# Patient Record
Sex: FEMALE | Race: ASIAN | NOT HISPANIC OR LATINO | Employment: FULL TIME | URBAN - METROPOLITAN AREA
[De-identification: names, ages, dates, MRNs, and addresses within clinical notes are randomized per-mention and may not be internally consistent; named-entity substitution may affect disease eponyms.]

---

## 2018-12-12 ENCOUNTER — OFFICE VISIT (OUTPATIENT)
Dept: OBSTETRICS AND GYNECOLOGY | Facility: CLINIC | Age: 43
End: 2018-12-12
Payer: COMMERCIAL

## 2018-12-12 VITALS
HEIGHT: 65 IN | HEART RATE: 96 BPM | BODY MASS INDEX: 20.42 KG/M2 | DIASTOLIC BLOOD PRESSURE: 72 MMHG | SYSTOLIC BLOOD PRESSURE: 135 MMHG | WEIGHT: 122.56 LBS | RESPIRATION RATE: 15 BRPM

## 2018-12-12 DIAGNOSIS — Z12.31 BREAST CANCER SCREENING BY MAMMOGRAM: ICD-10-CM

## 2018-12-12 DIAGNOSIS — Z01.419 ENCOUNTER FOR WELL WOMAN EXAM WITH ROUTINE GYNECOLOGICAL EXAM: Primary | ICD-10-CM

## 2018-12-12 DIAGNOSIS — Z12.4 ENCOUNTER FOR PAPANICOLAOU SMEAR FOR CERVICAL CANCER SCREENING: ICD-10-CM

## 2018-12-12 PROCEDURE — 87624 HPV HI-RISK TYP POOLED RSLT: CPT

## 2018-12-12 PROCEDURE — 99386 PREV VISIT NEW AGE 40-64: CPT | Mod: S$GLB,,, | Performed by: OBSTETRICS & GYNECOLOGY

## 2018-12-12 PROCEDURE — 99999 PR PBB SHADOW E&M-NEW PATIENT-LVL III: CPT | Mod: PBBFAC,,, | Performed by: OBSTETRICS & GYNECOLOGY

## 2018-12-12 PROCEDURE — 88175 CYTOPATH C/V AUTO FLUID REDO: CPT

## 2018-12-12 NOTE — PROGRESS NOTES
SUBJECTIVE:   Alejandra Ellis is a 43 y.o. female   for annual well woman exam. No LMP recorded..      Contraception: none, h/o infertility, ovarian factor, tested at Dr. Tess Ellis's office  Cycles were monthly before, but last cycle skipped 2 months. Also has intermittent vasomotor symptoms - rare    No past medical history on file.  No past surgical history on file.  Social History     Socioeconomic History    Marital status:      Spouse name: Not on file    Number of children: Not on file    Years of education: Not on file    Highest education level: Not on file   Social Needs    Financial resource strain: Not on file    Food insecurity - worry: Not on file    Food insecurity - inability: Not on file    Transportation needs - medical: Not on file    Transportation needs - non-medical: Not on file   Occupational History    Not on file   Tobacco Use    Smoking status: Never Smoker    Smokeless tobacco: Never Used   Substance and Sexual Activity    Alcohol use: No     Frequency: Never    Drug use: No    Sexual activity: No   Other Topics Concern    Not on file   Social History Narrative    Not on file     No family history on file.  OB History    Para Term  AB Living   0 0 0 0 0 0   SAB TAB Ectopic Multiple Live Births   0 0 0 0 0         Obstetric Comments   Gynhx: regular every 2 months   Denies abnl pap   Denies abnl MMG,          No current outpatient medications on file.     No current facility-administered medications for this visit.      Allergies: Patient has no known allergies.       ROS:  GENERAL: Denies weight gain or weight loss. Feeling well overall.   SKIN: Denies rash or lesions.   HEAD: Denies head injury or headache.   NODES: Denies enlarged lymph nodes.   CHEST: Denies chest pain or shortness of breath.   CARDIOVASCULAR: Denies palpitations or left sided chest pain.   ABDOMEN: No abdominal pain, constipation, diarrhea, nausea, vomiting or rectal bleeding.  "  URINARY: No frequency, dysuria, hematuria, or burning on urination.  REPRODUCTIVE: Denies vaginal discharge, abnormal vaginal bleeding, lesions, pelvic pain  BREASTS: The patient performs breast self-examination and denies pain, lumps, or nipple discharge.   HEMATOLOGIC: No easy bruisability or excessive bleeding.  MUSCULOSKELETAL: Denies joint pain or swelling.   NEUROLOGIC: Denies syncope or weakness.   PSYCHIATRIC: Denies depression, anxiety or mood swings.      OBJECTIVE:   /72   Pulse 96   Resp 15   Ht 5' 5" (1.651 m)   Wt 55.6 kg (122 lb 9.2 oz)   BMI 20.40 kg/m²   The patient appears well, alert, oriented x 3, in no distress.  PSYCH:  Normal, full range of affect  NECK: negative, no thyromegaly, trachea midline  SKIN: normal, good color, good turgor and no acne, striae, hirsutism  BREAST EXAM: breasts appear normal, no suspicious masses, no skin or nipple changes or axillary nodes  ABDOMEN: soft, non-tender; bowel sounds normal; no masses,  no organomegaly and no hernias, masses, or hepatosplenomegaly  GENITALIA: normal external genitalia, no erythema, no discharge  BLADDER: soft  URETHRA: normal appearing urethra with no masses, tenderness or lesions and normal urethra, normal urethral meatus  VAGINA: Normal  CERVIX: no lesions or cervical motion tenderness  UTERUS: normal size, contour, position, consistency, mobility, non-tender  ADNEXA: no mass, fullness, tenderness      ASSESSMENT:   1. Health maintenance  -pap done. Discussed ASCCP guideline screening every 3 - 5 years.   -screening MMG ordered  -counseled on exercise and healthy diet  -bone health:  Discussed Vitamin D and Calcium supplementation, weight bearing exercises  2.  Discussed safety at home/school/work, healthy and balanced diet, sleep hygiene, as well as violence/weapons exposure.   3.  Perimenopausal transition: discussed supportive care for vasomotor symptoms.    "

## 2018-12-17 LAB
HPV HR 12 DNA CVX QL NAA+PROBE: NEGATIVE
HPV16 AG SPEC QL: NEGATIVE
HPV18 DNA SPEC QL NAA+PROBE: NEGATIVE

## 2018-12-20 ENCOUNTER — TELEPHONE (OUTPATIENT)
Dept: OBSTETRICS AND GYNECOLOGY | Facility: CLINIC | Age: 43
End: 2018-12-20

## 2018-12-20 NOTE — TELEPHONE ENCOUNTER
Notes recorded by Judson Bellamy LPN on 12/20/2018 at 4:19 PM CST  SPOKE WITH PT INFORMED HER THAT HER PAP SMEAR IS NORMAL, RELATIVE TRANSLATED FOR HER  ------

## 2019-01-02 ENCOUNTER — HOSPITAL ENCOUNTER (OUTPATIENT)
Dept: RADIOLOGY | Facility: HOSPITAL | Age: 44
Discharge: HOME | End: 2019-01-02
Attending: OBSTETRICS & GYNECOLOGY
Payer: COMMERCIAL

## 2019-01-02 VITALS — WEIGHT: 122 LBS | HEIGHT: 65 IN | BODY MASS INDEX: 20.33 KG/M2

## 2019-01-02 DIAGNOSIS — Z12.31 BREAST CANCER SCREENING BY MAMMOGRAM: ICD-10-CM

## 2019-01-02 PROCEDURE — 77067 MAMMO DIGITAL SCREENING BILAT WITH TOMOSYNTHESIS_CAD: ICD-10-PCS | Mod: 26,,, | Performed by: RADIOLOGY

## 2019-01-02 PROCEDURE — 77067 SCR MAMMO BI INCL CAD: CPT | Mod: 26,,, | Performed by: RADIOLOGY

## 2019-01-02 PROCEDURE — 77063 BREAST TOMOSYNTHESIS BI: CPT | Mod: 26,,, | Performed by: RADIOLOGY

## 2019-01-02 PROCEDURE — 77067 SCR MAMMO BI INCL CAD: CPT | Mod: TC

## 2019-01-02 PROCEDURE — 77063 MAMMO DIGITAL SCREENING BILAT WITH TOMOSYNTHESIS_CAD: ICD-10-PCS | Mod: 26,,, | Performed by: RADIOLOGY

## 2019-09-26 ENCOUNTER — HOSPITAL ENCOUNTER (OUTPATIENT)
Dept: RADIOLOGY | Facility: HOSPITAL | Age: 44
Discharge: HOME | End: 2019-09-26
Attending: PODIATRIST
Payer: COMMERCIAL

## 2019-09-26 DIAGNOSIS — M72.2 PLANTAR FASCIAL FIBROMATOSIS: Primary | ICD-10-CM

## 2019-09-26 DIAGNOSIS — S93.612A SPRAIN OF TARSAL LIGAMENT OF LEFT FOOT: ICD-10-CM

## 2019-09-26 DIAGNOSIS — S93.611A SPRAIN OF TARSAL LIGAMENT OF RIGHT FOOT: ICD-10-CM

## 2019-09-26 DIAGNOSIS — M72.2 PLANTAR FASCIAL FIBROMATOSIS: ICD-10-CM

## 2019-09-26 PROCEDURE — 73630 XR FOOT COMPLETE 3 VIEW RIGHT: ICD-10-PCS | Mod: 26,RT,, | Performed by: RADIOLOGY

## 2019-09-26 PROCEDURE — 73630 X-RAY EXAM OF FOOT: CPT | Mod: TC,FY,LT

## 2019-09-26 PROCEDURE — 73630 X-RAY EXAM OF FOOT: CPT | Mod: TC,FY,RT

## 2019-09-26 PROCEDURE — 73630 X-RAY EXAM OF FOOT: CPT | Mod: 26,LT,, | Performed by: RADIOLOGY

## 2019-09-26 PROCEDURE — 73630 XR FOOT COMPLETE 3 VIEW LEFT: ICD-10-PCS | Mod: 26,LT,, | Performed by: RADIOLOGY

## 2019-09-26 PROCEDURE — 73630 X-RAY EXAM OF FOOT: CPT | Mod: 26,RT,, | Performed by: RADIOLOGY

## 2019-10-15 ENCOUNTER — OFFICE VISIT (OUTPATIENT)
Dept: OBSTETRICS AND GYNECOLOGY | Facility: CLINIC | Age: 44
End: 2019-10-15
Payer: COMMERCIAL

## 2019-10-15 VITALS
SYSTOLIC BLOOD PRESSURE: 112 MMHG | DIASTOLIC BLOOD PRESSURE: 80 MMHG | BODY MASS INDEX: 20.06 KG/M2 | HEIGHT: 65 IN | WEIGHT: 120.38 LBS

## 2019-10-15 DIAGNOSIS — Z12.31 BREAST CANCER SCREENING BY MAMMOGRAM: Primary | ICD-10-CM

## 2019-10-15 PROCEDURE — 99999 PR PBB SHADOW E&M-EST. PATIENT-LVL III: CPT | Mod: PBBFAC,,, | Performed by: OBSTETRICS & GYNECOLOGY

## 2019-10-15 PROCEDURE — 99999 PR PBB SHADOW E&M-EST. PATIENT-LVL III: ICD-10-PCS | Mod: PBBFAC,,, | Performed by: OBSTETRICS & GYNECOLOGY

## 2019-10-15 PROCEDURE — 99499 NO LOS: ICD-10-PCS | Mod: S$GLB,,, | Performed by: OBSTETRICS & GYNECOLOGY

## 2019-10-15 PROCEDURE — 99499 UNLISTED E&M SERVICE: CPT | Mod: S$GLB,,, | Performed by: OBSTETRICS & GYNECOLOGY

## 2019-10-15 RX ORDER — ERGOCALCIFEROL 1.25 MG/1
CAPSULE ORAL
Refills: 3 | COMMUNITY
Start: 2019-09-25

## 2019-10-15 NOTE — PROGRESS NOTES
Pt here for WWE  No complaints  She is not due for WWE until 12/2019  Will rescheduled  Will order MMG

## 2019-10-21 ENCOUNTER — OFFICE VISIT (OUTPATIENT)
Dept: NEUROLOGY | Facility: CLINIC | Age: 44
End: 2019-10-21
Payer: COMMERCIAL

## 2019-10-21 VITALS
HEIGHT: 65 IN | BODY MASS INDEX: 20.28 KG/M2 | WEIGHT: 121.69 LBS | DIASTOLIC BLOOD PRESSURE: 68 MMHG | SYSTOLIC BLOOD PRESSURE: 133 MMHG | HEART RATE: 95 BPM

## 2019-10-21 DIAGNOSIS — M54.10 RADICULAR LEG PAIN: Primary | ICD-10-CM

## 2019-10-21 PROCEDURE — 99204 PR OFFICE/OUTPT VISIT, NEW, LEVL IV, 45-59 MIN: ICD-10-PCS | Mod: S$GLB,,, | Performed by: PSYCHIATRY & NEUROLOGY

## 2019-10-21 PROCEDURE — 3008F PR BODY MASS INDEX (BMI) DOCUMENTED: ICD-10-PCS | Mod: CPTII,S$GLB,, | Performed by: PSYCHIATRY & NEUROLOGY

## 2019-10-21 PROCEDURE — 99999 PR PBB SHADOW E&M-EST. PATIENT-LVL III: ICD-10-PCS | Mod: PBBFAC,,, | Performed by: PSYCHIATRY & NEUROLOGY

## 2019-10-21 PROCEDURE — 3008F BODY MASS INDEX DOCD: CPT | Mod: CPTII,S$GLB,, | Performed by: PSYCHIATRY & NEUROLOGY

## 2019-10-21 PROCEDURE — 99999 PR PBB SHADOW E&M-EST. PATIENT-LVL III: CPT | Mod: PBBFAC,,, | Performed by: PSYCHIATRY & NEUROLOGY

## 2019-10-21 PROCEDURE — 99204 OFFICE O/P NEW MOD 45 MIN: CPT | Mod: S$GLB,,, | Performed by: PSYCHIATRY & NEUROLOGY

## 2019-10-21 RX ORDER — GABAPENTIN 100 MG/1
100 CAPSULE ORAL NIGHTLY
Qty: 30 CAPSULE | Refills: 1 | Status: SHIPPED | OUTPATIENT
Start: 2019-10-21 | End: 2019-11-05 | Stop reason: SDUPTHER

## 2019-10-21 NOTE — LETTER
October 21, 2019      Alivia Lui, STANISLAW  [address] [facility] [address]           [facility] [address]  [facility] [address]  Phone: [phone]  Fax: [phone]          Patient: Alejandra Ellis   MR Number: [id number]   YOB: 1975   Date of Visit: 10/21/2019       Dear Dr. Alivia Lui:    Thank you for referring Alejandra Ellis to me for evaluation. Attached you will find relevant portions of my assessment and plan of care.    If you have questions, please do not hesitate to call me. I look forward to following Alejandra Ellis along with you.    Sincerely,    Laura Floyd, DO [place]  CC:  No Recipients    If you would like to receive this communication electronically, please contact [email or web address] or [phone] to request more information on [employer] Link access.    For providers and/or their staff who would like to refer a patient to [facility], please contact us through our one-stop-shop provider referral line, [facility] , at [phone].    If you feel you have received this communication in error or would no longer like to receive these types of communications, please e-mail [email or web address]

## 2019-10-21 NOTE — PROGRESS NOTES
Neurology Consult Note    Chief Complaint: radicular low back pain    HPI:   Alejandra Ellis is a 44 y.o. female with no significant pmhx who presents for further evaluation of radicular right leg pain. Telugu  was used via the phone line. She states for the past 6 months, she has had a gradual onset right buttock pain radiating down to her right ankle. She states at times she has associated low back pain. She states her pain is usually a 6/10 with activity, but at times it worsens to a 10/10 pian. She states the pain feels like a cramping only in her right leg when walking. She does not feel the pain when sitting. She denies bowel or bladder problems. She denies symptoms in her left foot or in her hands. She denies any inciting injury. She has no further complaints.     Past Medical History:  History reviewed. No pertinent past medical history.    Past Surgical History:  History reviewed. No pertinent surgical history.    Social History:  Social History     Socioeconomic History    Marital status:      Spouse name: Not on file    Number of children: Not on file    Years of education: Not on file    Highest education level: Not on file   Occupational History    Not on file   Social Needs    Financial resource strain: Not on file    Food insecurity:     Worry: Not on file     Inability: Not on file    Transportation needs:     Medical: Not on file     Non-medical: Not on file   Tobacco Use    Smoking status: Never Smoker    Smokeless tobacco: Never Used   Substance and Sexual Activity    Alcohol use: No     Frequency: Never    Drug use: No    Sexual activity: Yes     Partners: Male     Birth control/protection: None   Lifestyle    Physical activity:     Days per week: Not on file     Minutes per session: Not on file    Stress: Not on file   Relationships    Social connections:     Talks on phone: Not on file     Gets together: Not on file     Attends Restoration service: Not on file      Active member of club or organization: Not on file     Attends meetings of clubs or organizations: Not on file     Relationship status: Not on file   Other Topics Concern    Not on file   Social History Narrative    Not on file       Family History:  History reviewed. No pertinent family history.    Medications:  Current Outpatient Medications   Medication Sig Dispense Refill    ergocalciferol (ERGOCALCIFEROL) 50,000 unit Cap take ONE CAPSULE every week  3     No current facility-administered medications for this visit.        Allergies:  Review of patient's allergies indicates:  No Known Allergies    ROS:  A 12 point review of system was negative aside from pertinent positives and negatives as outlined above.    Physical Exam  Vitals:    10/21/19 1056   BP: 133/68   Pulse: 95       General: well nourished, well developed  Eyes: no scleral icterus   Nose: nasal turbinates intact  Neck: supple, ROM intact  Skin: no rash or ecchymosis  Joints: no swelling or erythema  Cardiac: regular rate and rhythm  Lungs: clear to auscultation bilaterally    Neuro:  Mental status: AAO x 3, no dysarthria, no aphasia, communicating appropriately  CN: PERRL, EOMI, VFF, V1-V3 sensation intact, no facial asymmetry, hearing grossly intact, tongue midline  Motor:   Deltoid R 5/5 L 5/5   Biceps R 5/5 L 5/5   Triceps R 5/5 L 5/5   Wrist flexion R 5/5 L 5/5   Wrist extension R 5/5 L 5/5   Finger abduction  R 5/5 L 5/5   Thumb abduction R 5/5 L 5/5     Hip flexion R 5/5 L 5/5   Knee extension R 5/5 L 5/5   Knee flexion R 5/5 L 5/5   Foot dorsiflexion R 5/5 L 5/5   Foot plantar flexion R 5/5 L 5/5   Foot inversion R 5/5 L 5/5   Foot eversion R 5/5 L 5/5     Normal bulk and tone    Reflexes: 2+ throughout, toes equivocal bilaterally  Sensory: intact to light touch, pinprick, vibration and position sense throughout  Coordination: no dysmetria on FTN  Gait: steady    Prior Imaging/Labs:  No recent neuroimaging available for review  Patient  states she had recent labs with her PCP, will have these faxed to our office    Assessment and Plan:    44 y.o. female with radicular right leg pain with activity likely due to lumbar radiculopathy/stenosis. Will obtain MRI L spine to further evaluate.    1. Radicular leg pain  - MRI Lumbar Spine Without Contrast; Future  - gabapentin (NEURONTIN) 100 MG capsule; Take 1 capsule (100 mg total) by mouth every evening.  Dispense: 30 capsule; Refill: 1  She was made aware of side effects of gabapentin and was advised to notify me if she experiences any. She was advised not to get pregnant on this medication.          Patient was advised to notify me for worsening symptoms. I will see patient back in 2 weeks after MRI L spine or sooner if necessary.     Thank you for this consultation.    Laura Floyd DO  [facility]  [address] [facility] [address]  [place] [address]  [phone]

## 2019-10-29 ENCOUNTER — HOSPITAL ENCOUNTER (OUTPATIENT)
Dept: RADIOLOGY | Facility: HOSPITAL | Age: 44
Discharge: HOME | End: 2019-10-29
Attending: PSYCHIATRY & NEUROLOGY
Payer: COMMERCIAL

## 2019-10-29 DIAGNOSIS — M54.10 RADICULAR LEG PAIN: ICD-10-CM

## 2019-10-29 PROCEDURE — 72148 MRI LUMBAR SPINE W/O DYE: CPT | Mod: 26,,, | Performed by: RADIOLOGY

## 2019-10-29 PROCEDURE — 72148 MRI LUMBAR SPINE WITHOUT CONTRAST: ICD-10-PCS | Mod: 26,,, | Performed by: RADIOLOGY

## 2019-10-29 PROCEDURE — 72148 MRI LUMBAR SPINE W/O DYE: CPT | Mod: TC

## 2019-11-05 ENCOUNTER — OFFICE VISIT (OUTPATIENT)
Dept: NEUROLOGY | Facility: CLINIC | Age: 44
End: 2019-11-05
Payer: COMMERCIAL

## 2019-11-05 VITALS
DIASTOLIC BLOOD PRESSURE: 67 MMHG | HEIGHT: 65 IN | BODY MASS INDEX: 20.16 KG/M2 | SYSTOLIC BLOOD PRESSURE: 124 MMHG | WEIGHT: 121 LBS | HEART RATE: 97 BPM

## 2019-11-05 DIAGNOSIS — M54.10 RADICULAR LEG PAIN: ICD-10-CM

## 2019-11-05 DIAGNOSIS — M54.16 LUMBAR RADICULOPATHY: Primary | ICD-10-CM

## 2019-11-05 PROCEDURE — 99214 PR OFFICE/OUTPT VISIT, EST, LEVL IV, 30-39 MIN: ICD-10-PCS | Mod: S$GLB,,, | Performed by: PSYCHIATRY & NEUROLOGY

## 2019-11-05 PROCEDURE — 99214 OFFICE O/P EST MOD 30 MIN: CPT | Mod: S$GLB,,, | Performed by: PSYCHIATRY & NEUROLOGY

## 2019-11-05 PROCEDURE — 3008F PR BODY MASS INDEX (BMI) DOCUMENTED: ICD-10-PCS | Mod: CPTII,S$GLB,, | Performed by: PSYCHIATRY & NEUROLOGY

## 2019-11-05 PROCEDURE — 99999 PR PBB SHADOW E&M-EST. PATIENT-LVL III: ICD-10-PCS | Mod: PBBFAC,,, | Performed by: PSYCHIATRY & NEUROLOGY

## 2019-11-05 PROCEDURE — 99999 PR PBB SHADOW E&M-EST. PATIENT-LVL III: CPT | Mod: PBBFAC,,, | Performed by: PSYCHIATRY & NEUROLOGY

## 2019-11-05 PROCEDURE — 3008F BODY MASS INDEX DOCD: CPT | Mod: CPTII,S$GLB,, | Performed by: PSYCHIATRY & NEUROLOGY

## 2019-11-05 RX ORDER — GABAPENTIN 100 MG/1
100 CAPSULE ORAL 3 TIMES DAILY
Qty: 90 CAPSULE | Refills: 3 | Status: SHIPPED | OUTPATIENT
Start: 2019-11-05 | End: 2020-01-03 | Stop reason: DRUGHIGH

## 2019-11-05 NOTE — PROGRESS NOTES
Neurology Follow Up Note    Chief Complaint: follow up for lumbar radiculopathy and MRI results     Interval History:  History was taken via Coin phone .  Since last visit, patient states her pain feels the same as before. She is taking gabapentin 100mg at bedtime and tolerating this well. She had an MRI L Spine which shows:  L4-L5: Moderate broad-based disc bulge with ventral thecal sac effacement, with minimal/mild neural foraminal narrowing with displacement without contact on the exiting nerve roots.    L5-S1: Significant, circumferential diffuse disc bulge with contained annular tear.  There is moderate posterior extrusion causing ventral thecal sac effacement with a small protrusion in the right subarticular zone.  Mild/moderate, right greater than left neural foraminal narrowing is noted with questionable contact of the right S1 exiting nerve root.    This is likely the cause of her pain. She denies bowel or bladder problems, weakness or recent falls. She has no further complaints.       Initial Visit 10/21/19:  Alejandra Ellis is a 44 y.o. female with no significant pmhx who presents for further evaluation of radicular right leg pain. [employer]  was used via the phone line. She states for the past 6 months, she has had a gradual onset right buttock pain radiating down to her right ankle. She states at times she has associated low back pain. She states her pain is usually a 6/10 with activity, but at times it worsens to a 10/10 pian. She states the pain feels like a cramping only in her right leg when walking. She does not feel the pain when sitting. She denies bowel or bladder problems. She denies symptoms in her left foot or in her hands. She denies any inciting injury. She has no further complaints.     Past Medical History:  History reviewed. No pertinent past medical history.    Past Surgical History:  History reviewed. No pertinent surgical history.    Social History:  Social  History     Socioeconomic History    Marital status:      Spouse name: Not on file    Number of children: Not on file    Years of education: Not on file    Highest education level: Not on file   Occupational History    Not on file   Social Needs    Financial resource strain: Not on file    Food insecurity:     Worry: Not on file     Inability: Not on file    Transportation needs:     Medical: Not on file     Non-medical: Not on file   Tobacco Use    Smoking status: Never Smoker    Smokeless tobacco: Never Used   Substance and Sexual Activity    Alcohol use: No     Frequency: Never    Drug use: No    Sexual activity: Yes     Partners: Male     Birth control/protection: None   Lifestyle    Physical activity:     Days per week: Not on file     Minutes per session: Not on file    Stress: Not on file   Relationships    Social connections:     Talks on phone: Not on file     Gets together: Not on file     Attends Anglican service: Not on file     Active member of club or organization: Not on file     Attends meetings of clubs or organizations: Not on file     Relationship status: Not on file   Other Topics Concern    Not on file   Social History Narrative    Not on file       Family History:  History reviewed. No pertinent family history.    Medications:  Current Outpatient Medications   Medication Sig Dispense Refill    ergocalciferol (ERGOCALCIFEROL) 50,000 unit Cap take ONE CAPSULE every week  3    gabapentin (NEURONTIN) 100 MG capsule Take 1 capsule (100 mg total) by mouth every evening. 30 capsule 1     No current facility-administered medications for this visit.        Allergies:  Review of patient's allergies indicates:  No Known Allergies    ROS:  A 12 point review of system was negative aside from pertinent positives and negatives as outlined above.    Physical Exam  Vitals:    11/05/19 0920   BP: 124/67   Pulse: 97       General: well nourished, well developed  Eyes: no scleral  icterus   Nose: nasal turbinates intact  Neck: supple, ROM intact  Skin: no rash or ecchymosis  Joints: no swelling or erythema  Cardiac: regular rate and rhythm  Lungs: clear to auscultation bilaterally    Neuro:  Mental status: AAO x 3, no dysarthria, no aphasia, communicating appropriately  CN: PERRL, EOMI, VFF, V1-V3 sensation intact, no facial asymmetry, hearing grossly intact, tongue midline  Motor:   Deltoid R 5/5 L 5/5   Biceps R 5/5 L 5/5   Triceps R 5/5 L 5/5   Wrist flexion R 5/5 L 5/5   Wrist extension R 5/5 L 5/5   Finger abduction  R 5/5 L 5/5   Thumb abduction R 5/5 L 5/5      Hip flexion R 5/5 L 5/5   Knee extension R 5/5 L 5/5   Knee flexion R 5/5 L 5/5   Foot dorsiflexion R 5/5 L 5/5   Foot plantar flexion R 5/5 L 5/5   Foot inversion R 5/5 L 5/5   Foot eversion R 5/5 L 5/5      Normal bulk and tone     Reflexes: 2+ throughout, toes equivocal bilaterally  Sensory: intact to light touch, pinprick, vibration and position sense throughout  Coordination: no dysmetria on FTN  Gait: steady    Prior Imaging/Labs:  MRI LUMBAR SPINE WITHOUT CONTRAST    CLINICAL HISTORY:  radicular low back pain; Radiculopathy, site unspecified    TECHNIQUE:  Multiplanar, multisequence MR images were acquired from the thoracolumbar junction to the sacrum without the administration of contrast.    COMPARISON:  None.    FINDINGS:  Exam was somewhat limited by patient motion artifact.    Alignment: There is a large of normal lumbar lordosis with straightening of the lumbar spine    Vertebrae: Normal marrow signal. No fracture.  Modic type 3 changes are noted at the anterior portion of L5-S1. there are apparent hypoplastic ribs at L1.  Mild-to-moderate facet arthropathy is noted greatest at the lower lumbar spine.    Discs: Intervertebral disc height loss is noted at L4-5 and L5-S1 with desiccation at L4-5.  Additionally, there is apparent circumferential annular tear at the L5-S1 level.    Cord: Normal.  Conus terminates at  L1-2.    Degenerative findings:    T12-L1: No disc bulge.  No evidence of neural foraminal or spinal canal narrowing or stenosis.    L1-L2: No disc bulge.  No evidence of neural foraminal or spinal canal narrowing or stenosis.    L2-L3: No disc bulge.  No evidence of neural foraminal or spinal canal stenosis.    L3-L4: No disc bulge.  No evidence of neural foraminal or spinal canal stenosis.    L4-L5: Moderate broad-based disc bulge with ventral thecal sac effacement, with minimal/mild neural foraminal narrowing with displacement without contact on the exiting nerve roots.    L5-S1: Significant, circumferential diffuse disc bulge with contained annular tear.  There is moderate posterior extrusion causing ventral thecal sac effacement with a small protrusion in the right subarticular zone.  Mild/moderate, right greater than left neural foraminal narrowing is noted with questionable contact of the right S1 exiting nerve root.    Paraspinal muscles & soft tissues: Unremarkable.      Impression       Lumbar disc degeneration as described above at L5-S1 greater than L4-5 with circumferential diffuse disc bulge at L5-S1 with posterior extrusion and small protrusion of the right subarticular zone.       Assessment and Plan:    44 y.o. female with radicular right leg pain 2/2 lumbar radiculopathy      1. Lumbar radiculopathy  - gabapentin (NEURONTIN) 100 MG capsule; Take 1 capsule (100 mg total) by mouth 3 (three) times daily.  Dispense: 90 capsule; Refill: 3  - Ambulatory consult to Physical Therapy  - Ambulatory Referral to Pain Clinic    Patient was made aware of side effects of medication(s) prescribed and was advised to notify me if she experiences any.        Patient was advised to notify me for worsening symptoms. I will see patient back in 3 months or sooner if necessary.     Laura Floyd DO  [facility]  [address] [facility] [address]  [place] [id number] [phone]

## 2019-11-07 ENCOUNTER — TELEPHONE (OUTPATIENT)
Dept: PAIN MEDICINE | Facility: CLINIC | Age: 44
End: 2019-11-07

## 2019-11-08 ENCOUNTER — OFFICE VISIT (OUTPATIENT)
Dept: PAIN MEDICINE | Facility: CLINIC | Age: 44
End: 2019-11-08
Payer: COMMERCIAL

## 2019-11-08 VITALS
HEART RATE: 95 BPM | HEIGHT: 65 IN | TEMPERATURE: 98 F | WEIGHT: 119.19 LBS | BODY MASS INDEX: 19.86 KG/M2 | DIASTOLIC BLOOD PRESSURE: 71 MMHG | SYSTOLIC BLOOD PRESSURE: 116 MMHG | OXYGEN SATURATION: 98 %

## 2019-11-08 DIAGNOSIS — M47.816 LUMBAR SPONDYLOSIS: ICD-10-CM

## 2019-11-08 DIAGNOSIS — G56.03 BILATERAL CARPAL TUNNEL SYNDROME: ICD-10-CM

## 2019-11-08 DIAGNOSIS — M54.16 LUMBAR RADICULOPATHY: ICD-10-CM

## 2019-11-08 DIAGNOSIS — M51.36 DDD (DEGENERATIVE DISC DISEASE), LUMBAR: Primary | ICD-10-CM

## 2019-11-08 PROBLEM — M51.369 DDD (DEGENERATIVE DISC DISEASE), LUMBAR: Status: ACTIVE | Noted: 2019-11-08

## 2019-11-08 PROCEDURE — 99244 OFF/OP CNSLTJ NEW/EST MOD 40: CPT | Mod: S$GLB,,, | Performed by: PAIN MEDICINE

## 2019-11-08 PROCEDURE — 99999 PR PBB SHADOW E&M-EST. PATIENT-LVL IV: CPT | Mod: PBBFAC,,, | Performed by: PAIN MEDICINE

## 2019-11-08 PROCEDURE — 99999 PR PBB SHADOW E&M-EST. PATIENT-LVL IV: ICD-10-PCS | Mod: PBBFAC,,, | Performed by: PAIN MEDICINE

## 2019-11-08 PROCEDURE — 99244 PR OFFICE CONSULTATION,LEVEL IV: ICD-10-PCS | Mod: S$GLB,,, | Performed by: PAIN MEDICINE

## 2019-11-08 NOTE — LETTER
November 8, 2019      Laura Floyd, DO  [address] [facility]  [address]  [place] [address]           [facility]  [address]  [facility] [address]  Phone: [phone]  Fax: [phone]          Patient: Alejandra Ellis   MR Number: [id number]   YOB: 1975   Date of Visit: 11/8/2019       Dear Dr. Laura Floyd:    Thank you for referring Alejandra Ellis to me for evaluation. Attached you will find relevant portions of my assessment and plan of care.    If you have questions, please do not hesitate to call me. I look forward to following Alejandra Ellis along with you.    Sincerely,    Reji Vazquez Jr., MD    Enclosure  CC:  No Recipients    If you would like to receive this communication electronically, please contact [email or web address] or [phone] to request more information on [employer] Link access.    For providers and/or their staff who would like to refer a patient to [facility], please contact us through our one-stop-shop provider referral line, [facility] [place], at [phone].    If you feel you have received this communication in error or would no longer like to receive these types of communications, please e-mail [email or web address]

## 2019-11-08 NOTE — PROGRESS NOTES
Subjective:     Patient ID: Alejandra Ellis is a 44 y.o. female    Chief Complaint: Low-back Pain (pt takes medication and PT . No injections ) and Leg Pain      Referred by: Laura Floyd DO      HPI:    Initial Encounter (11/8/19):  Alejandra Ellis is a 44 y.o. female who presents today with chronic right-sided low back and lower extremity pain. This pain has been present for over 6 months.  No specific inciting event or injury noted.  The pain is located in the right lumbosacral region radiates the right lower extremity via the S1 dermatome.  Patient reports numbness and tingling in this area as well. She reports weakness in her right lower extremity.  She denies any bowel bladder dysfunction.  The pain is constant and worsened with activity.  Patient also states that she wakes in the morning with bilateral hand numbness.  The numbness is mainly located in the fingers and involves all 10 fingers..   This pain is described in detail below.    Physical Therapy:  No.    Non-pharmacologic Treatment:  Rest helps         · TENS?  No    Pain Medications:         · Currently taking:  Gabapentin 100 mg t.i.d.    · Has tried in the past:      · Has not tried:  Opioids, NSAIDs, Tylenol, Muscle relaxants, TCAs, SNRIs, topical creams    Blood thinners:  None    Interventional Therapies:  None    Relevant Surgeries:  None    Affecting sleep?  Yes    Affecting daily activities? yes    Depressive symptoms? no          · SI/HI? No    Work status: Employed     Pain Scores:    Best:      4/10  Worst:   10/10  Usually:  8/10  Today:   4/10    Review of Systems   Constitutional: Negative for activity change, appetite change, chills, fatigue, fever and unexpected weight change.   HENT: Negative for hearing loss.    Eyes: Negative for visual disturbance.   Respiratory: Negative for chest tightness and shortness of breath.    Cardiovascular: Negative for chest pain.   Gastrointestinal: Negative for abdominal pain, constipation, diarrhea,  nausea and vomiting.   Genitourinary: Negative for difficulty urinating.   Musculoskeletal: Positive for back pain, gait problem and myalgias. Negative for neck pain.   Skin: Negative for rash.   Neurological: Positive for weakness and numbness. Negative for dizziness, light-headedness and headaches.   Psychiatric/Behavioral: Positive for sleep disturbance. Negative for hallucinations and suicidal ideas. The patient is not nervous/anxious.        History reviewed. No pertinent past medical history.    History reviewed. No pertinent surgical history.    Social History     Socioeconomic History    Marital status:      Spouse name: Not on file    Number of children: Not on file    Years of education: Not on file    Highest education level: Not on file   Occupational History    Not on file   Social Needs    Financial resource strain: Not on file    Food insecurity:     Worry: Not on file     Inability: Not on file    Transportation needs:     Medical: Not on file     Non-medical: Not on file   Tobacco Use    Smoking status: Never Smoker    Smokeless tobacco: Never Used   Substance and Sexual Activity    Alcohol use: No     Frequency: Never    Drug use: No    Sexual activity: Yes     Partners: Male     Birth control/protection: None   Lifestyle    Physical activity:     Days per week: Not on file     Minutes per session: Not on file    Stress: Not on file   Relationships    Social connections:     Talks on phone: Not on file     Gets together: Not on file     Attends Caodaism service: Not on file     Active member of club or organization: Not on file     Attends meetings of clubs or organizations: Not on file     Relationship status: Not on file   Other Topics Concern    Not on file   Social History Narrative    Not on file       Review of patient's allergies indicates:  No Known Allergies    Current Outpatient Medications on File Prior to Visit   Medication Sig Dispense Refill     "ergocalciferol (ERGOCALCIFEROL) 50,000 unit Cap take ONE CAPSULE every week  3    gabapentin (NEURONTIN) 100 MG capsule Take 1 capsule (100 mg total) by mouth 3 (three) times daily. 90 capsule 3     No current facility-administered medications on file prior to visit.        Objective:      /71   Pulse 95   Temp 98.2 °F (36.8 °C) (Oral)   Ht 5' 5" (1.651 m)   Wt 54.1 kg (119 lb 3.2 oz)   LMP 10/10/2019 (Exact Date)   SpO2 98%   BMI 19.84 kg/m²     Exam:  GEN:  Well developed, well nourished.  No acute distress.  Normal pain behavior.  HEENT:  No trauma.  Mucous membranes moist.  Nares patent bilaterally.  PSYCH: Normal affect. Thought content appropriate.  CHEST:  Breathing symmetric.  No audible wheezing.  ABD: Soft, non-distended.  SKIN:  Warm, pink, dry.  No rash on exposed areas.    EXT:  No cyanosis, clubbing, or edema.  No color change or changes in nail or hair growth.  NEURO/MUSCULOSKELETAL:  Fully alert, oriented, and appropriate. Speech normal beatriz. No cranial nerve deficits.   Gait:  Normal.  No trendelenburg sign bilaterally.   Motor Strength: 5/5 motor strength throughout lower extremities.   Sensory:  No sensory deficit in the lower extremities.   Reflexes:  Unable to elicit right Achilles deep tendon reflex; otherwise 2+ and symmetric throughout.  Downgoing Babinski's bilaterally.  No clonus or spasticity.  L-Spine:  Full ROM with pain on extension. Negative pain with axial/facet loading bilaterally.  Positive SLR on the right.    No TTP over lumbar paraspinals, bilateral SI joints, hips, piriformis muscles, or GTB.      Tinels negative at the wrist bilaterally        Imaging:  Narrative     EXAMINATION:  MRI LUMBAR SPINE WITHOUT CONTRAST    CLINICAL HISTORY:  radicular low back pain; Radiculopathy, site unspecified    TECHNIQUE:  Multiplanar, multisequence MR images were acquired from the thoracolumbar junction to the sacrum without the administration of " contrast.    COMPARISON:  None.    FINDINGS:  Exam was somewhat limited by patient motion artifact.    Alignment: There is a large of normal lumbar lordosis with straightening of the lumbar spine    Vertebrae: Normal marrow signal. No fracture.  Modic type 3 changes are noted at the anterior portion of L5-S1. there are apparent hypoplastic ribs at L1.  Mild-to-moderate facet arthropathy is noted greatest at the lower lumbar spine.    Discs: Intervertebral disc height loss is noted at L4-5 and L5-S1 with desiccation at L4-5.  Additionally, there is apparent circumferential annular tear at the L5-S1 level.    Cord: Normal.  Conus terminates at L1-2.    Degenerative findings:    T12-L1: No disc bulge.  No evidence of neural foraminal or spinal canal narrowing or stenosis.    L1-L2: No disc bulge.  No evidence of neural foraminal or spinal canal narrowing or stenosis.    L2-L3: No disc bulge.  No evidence of neural foraminal or spinal canal stenosis.    L3-L4: No disc bulge.  No evidence of neural foraminal or spinal canal stenosis.    L4-L5: Moderate broad-based disc bulge with ventral thecal sac effacement, with minimal/mild neural foraminal narrowing with displacement without contact on the exiting nerve roots.    L5-S1: Significant, circumferential diffuse disc bulge with contained annular tear.  There is moderate posterior extrusion causing ventral thecal sac effacement with a small protrusion in the right subarticular zone.  Mild/moderate, right greater than left neural foraminal narrowing is noted with questionable contact of the right S1 exiting nerve root.    Paraspinal muscles & soft tissues: Unremarkable.   Impression       Lumbar disc degeneration as described above at L5-S1 greater than L4-5 with circumferential diffuse disc bulge at L5-S1 with posterior extrusion and small protrusion of the right subarticular zone.      Electronically signed by: Santo Hillman  Date: 10/29/2019  Time: 10:05          Assessment:       Encounter Diagnoses   Name Primary?    DDD (degenerative disc disease), lumbar Yes    Lumbar spondylosis     Lumbar radiculopathy     Bilateral carpal tunnel syndrome          Plan:       Alejandra was seen today for low-back pain and leg pain.    Diagnoses and all orders for this visit:    DDD (degenerative disc disease), lumbar  -     Ambulatory Referral to Physical/Occupational Therapy    Lumbar spondylosis  -     Ambulatory Referral to Physical/Occupational Therapy    Lumbar radiculopathy  -     Ambulatory Referral to Physical/Occupational Therapy    Bilateral carpal tunnel syndrome  -     EMG W/ ULTRASOUND AND NERVE CONDUCTION TEST 2 Extremities; Future        Alejandra Rhonda is a 44 y.o. female with right-sided low back and lower extremity pain.  Pain appears to be most consistent with right S1 radiculopathy secondary to an L5-S1 disc protrusion.  Also with symptoms consistent with bilateral carpal tunnel syndrome..    1.  Pertinent imaging studies reviewed by me. Imaging results were discussed with patient.  2.  Refer to PT for lumbar ROM, strengthening, stretching and HEP  3.  EMG/nerve conduction study to rule out carpal tunnel syndrome.  4.  Continue gabapentin 100 mg t.i.d..  5.  Return to clinic in 8 weeks or sooner if needed.  That time we will discuss efficacy of physical therapy/home exercise program.  May consider increasing gabapentin dosage versus right S1 transforaminal epidural steroid injection.

## 2019-11-13 ENCOUNTER — PROCEDURE VISIT (OUTPATIENT)
Dept: NEUROLOGY | Facility: CLINIC | Age: 44
End: 2019-11-13
Payer: COMMERCIAL

## 2019-11-13 DIAGNOSIS — G56.03 BILATERAL CARPAL TUNNEL SYNDROME: ICD-10-CM

## 2019-11-13 PROCEDURE — 95886 PR EMG COMPLETE, W/ NERVE CONDUCTION STUDIES, 5+ MUSCLES: ICD-10-PCS | Mod: S$GLB,,, | Performed by: PSYCHIATRY & NEUROLOGY

## 2019-11-13 PROCEDURE — 95913 NRV CNDJ TEST 13/> STUDIES: CPT | Mod: S$GLB,,, | Performed by: PSYCHIATRY & NEUROLOGY

## 2019-11-13 PROCEDURE — 95886 MUSC TEST DONE W/N TEST COMP: CPT | Mod: S$GLB,,, | Performed by: PSYCHIATRY & NEUROLOGY

## 2019-11-13 PROCEDURE — 95913 PR NERVE CONDUCTION STUDY; 13 OR MORE STUDIES: ICD-10-PCS | Mod: S$GLB,,, | Performed by: PSYCHIATRY & NEUROLOGY

## 2019-11-26 NOTE — PROCEDURES
Procedures     Neurology EMG/NCS Note    Patient was referred for EMG/NCS. EMG/NCS was performed. Please see scanned EMG/NCS report for further details.    Patient was advised to follow up with referring physician for further management. EMG/NCS results were explained to patient via  phone. She was advised to wear wrist splints at bedtime and to continue to follow with pain management for lumbar radiculopathy    She was advised to notify me for worsening symptoms. I will see her back in 2 months or sooner if necessary.          Laura Floyd

## 2019-12-03 ENCOUNTER — CLINICAL SUPPORT (OUTPATIENT)
Dept: REHABILITATION | Facility: HOSPITAL | Age: 44
End: 2019-12-03
Attending: PSYCHIATRY & NEUROLOGY
Payer: COMMERCIAL

## 2019-12-03 DIAGNOSIS — M54.16 LUMBAR RADICULOPATHY: ICD-10-CM

## 2019-12-03 DIAGNOSIS — R53.1 DECREASED STRENGTH, ENDURANCE, AND MOBILITY: ICD-10-CM

## 2019-12-03 DIAGNOSIS — M54.41 ACUTE BILATERAL LOW BACK PAIN WITH RIGHT-SIDED SCIATICA: ICD-10-CM

## 2019-12-03 DIAGNOSIS — R68.89 DECREASED STRENGTH, ENDURANCE, AND MOBILITY: ICD-10-CM

## 2019-12-03 DIAGNOSIS — Z74.09 DECREASED STRENGTH, ENDURANCE, AND MOBILITY: ICD-10-CM

## 2019-12-03 PROCEDURE — 97161 PT EVAL LOW COMPLEX 20 MIN: CPT | Mod: PN

## 2019-12-03 PROCEDURE — 97110 THERAPEUTIC EXERCISES: CPT | Mod: PN

## 2019-12-03 NOTE — PLAN OF CARE
"[facility]   PATIENT EVALUATION    Name: Alejandra Ellis  Clinic Number: [id number]    Diagnosis:   Encounter Diagnoses   Name Primary?    Lumbar radiculopathy     Acute bilateral low back pain with right-sided sciatica     Decreased strength, endurance, and mobility      Physician: Laura Floyd DO  Treatment Orders: PT Eval and Treat    History     No past medical history on file.  Current Outpatient Medications   Medication Sig    ergocalciferol (ERGOCALCIFEROL) 50,000 unit Cap take ONE CAPSULE every week    gabapentin (NEURONTIN) 100 MG capsule Take 1 capsule (100 mg total) by mouth 3 (three) times daily.     No current facility-administered medications for this visit.      Review of patient's allergies indicates:  No Known Allergies    Precautions: standard    Evaluation Date: 12/3/19  Start Time: 1015  Stop Time: 1050  Visit # authorized: 1/1  Authorization period: 11/7/20  Plan of care expiration: 2/3/20  MD referral: Reji Vazquez Jr., MD    Hx of present illness: Pain began insidiously about 6 months ago. She has hx of back pain a few years ago with no tx either time.       MRI l/s: "Lumbar disc degeneration as described above at L5-S1 greater than L4-5 with circumferential diffuse disc bulge at L5-S1 with posterior extrusion and small protrusion of the right subarticular zone."  Subjective     Alejandra Ellis states she has herniation of spinal disc. She has pain from her butt and hip to the heels on the R side running down the back of the legs and just the heel on the L. She has increased pain while walking and must rest after 10 minutes. She has difficulty shopping and doing chores and doesn't have any help. Pain wakes her up in the middle of the night but is not constant.    Pain:  Location: low back and RLE posterior to thigh to heel  Description: sharp, n/t in heels  Activities Which Increase Pain: walking  Activities Which Decrease Pain: heating pad, pain patch  Pain Scale: 7/10 now " "7/10 at worst 5/10 at best    Red flags:  Pt. denies bowel/bladder symptoms (urinary retention/fecal incontinence).   Recent weight loss? denies   Constant/Night pain that is unchanging with change of position? denies   PMH of CA? cancer      Physical Therapy Goals: decrease pain    Objective     Observation: Patient ambulated into clinic with no AD   L posterior/R anterior rotated innominate    Posture: weight shift to LLE, R knee bent, decreased thoracic kyphosis and lumbar lordosis    Lumbar ROM: (measured in degrees)    Degrees Quality   Flexion   75%    Extension 75%    Left Side Bending WNL    Right Side Bending WNL    DKTC decreased sx  HARRIS and press ups increased sx    Lower Extremity Strength (graded 0-5 out of 5)   RLE LLE   Hip flexion: 4+/5 5/5   Hip ER 4+/5 5/5   Hip IR 4+/5 5/5   Knee extension: 4/5 4+/5   Knee flexion 4/5 4+/5   Ankle dorsiflexion: 5/5 5/5   Hip abduction 4/5 4+/5   Ankle plantarflexion 5/5 5/5   Hip extension: 4/5 5/5     Active/Passive Hip ROM: (measured in degrees) WNLBLE    Special Tests: ((+): pos.; (-): neg.)   · SLR Test: positive R  · Bridge Test: negative  · Neural Tension: positive sciatic R    Flexibility: WNL        PT reviewed FOTO scores for Alejandra Ellis on 12/3/19  FOTO score: 47% limited2    Functional Limitations Reports - G Codes  Category: mobility  Tool: FOTO      Current ():  CK  Goal (): CJ      TREATMENT time separate from evaluation    Time in: 1035  Time Out: 1045    Therapeutic exercise: Alejandra received therapeutic exercises to develop strength and endurance, flexibility for 10 minutes including:     MET for R ant/L post rotated innominate 3x5"  Adduction squeeze 15x5"  Sciatic nerve glide R x15  Piriformis st figure 4 2x30" R  DKTC 2x30"    Pt. Education: Instructed patient regarding:body mechanics, posture, activity modification/avoidance, and proper technique with all exercises. Alejandra demonstrated good return demonstration of activities and she was " provided with a handout. Barriers include language but  present for evaluation.     Assessment   Patient is a 44 y.o. female referred to outpatient physical therapy who presents with a PT diagnosis of low back pain with scaitica demonstrating joint dysfunction and functional limitation as described below. Level of complexity is low;  based on patient's past medical history including the below co-morbidities and personal factors; functional limitations, and clinical presentation directly impacting his/her plan of care. Pt demonstrates excellent rehab potential. Pt will benefit from physcial therapy services in order to maximize pain free functional mobility. The following goals were discussed with the patient and patient is in agreement with them as to be addressed in the treatment plan. Pt was given a HEP consisting of sciatic nerve glides, piriformis st and DKTC. Pt verbally understood the instructions as they were given and demonstrated proper form and technique during therapy. Pt was advised to perform these exercises free of pain, and to stop performing them if pain occurs.         History  Co-morbidities and personal factors that may impact the plan of care Examination  Body Structures and Functions, activity limitations and participation restrictions that may impact the plan of care Clinical Presentation   Decision Making/ Complexity Score   Co-morbidities:   none            Personal Factors:   Samoan speaking Body Regions: low back and BLE    Body Systems: musculoskeletal      Activity limitations: walking      Participation Restrictions: chores   stable   low       Medical necessity is demonstrated by the following IMPAIRMENTS/PROBLEM LIST:    1) Pain limiting function   2) Posture dysfunction   3) Core/Lumbar/LE weakness   4) Decreased Lumbar ROM   5) Lack of HEP   6) LE paresthesia     GOALS:   Short Term Goals:  4 weeks  1. Patient will be proficient and compliant in HEP.  2. Patient will  be able to walk >/= 20 minutes without an increase in symptoms.  3. Patient will be able to sleep through the night without waking up due to LE symptoms.    Long Term Goals: 8 weeks  1. Pt will be </= 32% limited in mobility according to FOTO.  2. Patient will be able to walk >/= 30 minutes without an increase in symptoms  3. Patient will be able to complete an hour grocery shopping trip without an increase in symptoms.  4. Patient will demonstrate 5/5 BLE strength in order to improve functional mobility.        Plan     Pt will be treated by physical therapy 2 times a week for 8 weeks for pt. education, HEP, therapeutic exercises, neuromuscular re-education, soft tissue and joint mobilizations; and modalities prn to achieve established goals. Alejandra may at times be seen by a PTA as part of the Rehab Team.     I certify the need for these services furnished under this plan of treatment and while under my care.  ______________________________ Physician/Referring Practitioner  Date of Signature      Jaclyn Donaldson, PT, DPT

## 2019-12-12 ENCOUNTER — CLINICAL SUPPORT (OUTPATIENT)
Dept: REHABILITATION | Facility: HOSPITAL | Age: 44
End: 2019-12-12
Attending: PSYCHIATRY & NEUROLOGY
Payer: COMMERCIAL

## 2019-12-12 DIAGNOSIS — M54.41 ACUTE BILATERAL LOW BACK PAIN WITH RIGHT-SIDED SCIATICA: ICD-10-CM

## 2019-12-12 DIAGNOSIS — Z74.09 DECREASED STRENGTH, ENDURANCE, AND MOBILITY: ICD-10-CM

## 2019-12-12 DIAGNOSIS — R53.1 DECREASED STRENGTH, ENDURANCE, AND MOBILITY: ICD-10-CM

## 2019-12-12 DIAGNOSIS — R68.89 DECREASED STRENGTH, ENDURANCE, AND MOBILITY: ICD-10-CM

## 2019-12-12 PROCEDURE — 97110 THERAPEUTIC EXERCISES: CPT | Mod: PN

## 2019-12-12 NOTE — PROGRESS NOTES
"                                                  Physical Therapy Daily Note     Date: 12/12/2019  Name: Alejandra Ellis  Clinic Number: [id number]  Diagnosis: No diagnosis found.  Physician: Laura Floyd DO    Precautions: standard     Evaluation Date: 12/3/19  Start Time: 1400  Stop Time: 1500  Visit # authorized: 2/21  Authorization period: 11/7/20  Plan of care expiration: 2/3/20  MD referral: Reji Vazquez Jr., MD     Hx of present illness: Pain began insidiously about 6 months ago. She has hx of back pain a few years ago with no tx either time.        MRI l/s: "Lumbar disc degeneration as described above at L5-S1 greater than L4-5 with circumferential diffuse disc bulge at L5-S1 with posterior extrusion and small protrusion of the right subarticular zone."      Subjective     Pt reports her back is about the same and she has numbness down the backs of her legs. She tried HEP with no difficulty/pain. (information provided via one world translation.)    Objective     Patient received individual therapy to increase strength, endurance, ROM, flexibility, posture and core stabilization with activities as follows:     Alejandra received therapeutic exercises to develop strength, endurance, ROM, flexibility, posture and core stabilization for 52 minutes including:     Bike x5 min  MET for R ant/L post rotated innominate 5x5"  Adduction squeeze 20x5"  TA contraction 20 x5"  Supine lat pull RTB x30  Piriformis st figure 4 3x30" B  Sciatic nerve glide R x20  DKTC 3x30"  DKTC with SB 20x3"  pallof press RTB x20 B  Calf stretch x1 min    Alejandra received the following manual therapy techniques for 8 minutes.     Lumbar traction on SB x5 min (pt reports increase in numbness, do not perform next session.  Posterior R fibula mobilizations (decrease in symptoms following)    Patient received MH to low back for 10 minutes post tx    Written Home Exercises Provided: piriformis st, sciatic nerve glide, DKTC, ta contraction, " adduction squeeze  Pt demo good understanding of the education provided. Alejandra demonstrated good return demonstration of activities.     Education provided: ALEISHA Roberts verbalized good understanding of education provided.   No spiritual or educational barriers to learning identified.    Assessment     Patient tolerated tx well. Information regarding symptoms and response to treatment provided with  today. Patient entered tx with pelvic rotation, corrected with MET. Added various stabilization exercises to improve strength. Patient with reports of decreased symptoms with DKTC but increased symptoms with manual traction. Patients symptoms all in R lower leg today. With posterior fibular mobilizations, patient with reports of decreased numbness so there could be a peripheral nerve involvement as well. Patient will continue to benefit from skilled PT in order to decrease pain, increase strength/ROm, and improve functional mobility.       Plan   Continue with established Plan of Care towards PT goals.      Therapist: Jaclyn Donaldson, PT, DPT

## 2019-12-17 ENCOUNTER — CLINICAL SUPPORT (OUTPATIENT)
Dept: REHABILITATION | Facility: HOSPITAL | Age: 44
End: 2019-12-17
Attending: PSYCHIATRY & NEUROLOGY
Payer: COMMERCIAL

## 2019-12-17 DIAGNOSIS — M54.41 ACUTE BILATERAL LOW BACK PAIN WITH RIGHT-SIDED SCIATICA: ICD-10-CM

## 2019-12-17 DIAGNOSIS — R53.1 DECREASED STRENGTH, ENDURANCE, AND MOBILITY: ICD-10-CM

## 2019-12-17 DIAGNOSIS — Z74.09 DECREASED STRENGTH, ENDURANCE, AND MOBILITY: ICD-10-CM

## 2019-12-17 DIAGNOSIS — R68.89 DECREASED STRENGTH, ENDURANCE, AND MOBILITY: ICD-10-CM

## 2019-12-17 PROCEDURE — 97110 THERAPEUTIC EXERCISES: CPT | Mod: PN

## 2019-12-17 NOTE — PROGRESS NOTES
"                                                  Physical Therapy Daily Note     Date: 12/17/2019  Name: Alejandra Ellis  Clinic Number: [id number]  Diagnosis:   Encounter Diagnoses   Name Primary?    Acute bilateral low back pain with right-sided sciatica     Decreased strength, endurance, and mobility      Physician: Laura Floyd DO    Precautions: standard     Evaluation Date: 12/3/19  Start Time: 900  Stop Time: 1000  Visit # authorized: 3/21  Authorization period: 11/7/20  Plan of care expiration: 2/3/20  MD referral: Reji Vazquez Jr., MD     Hx of present illness: Pain began insidiously about 6 months ago. She has hx of back pain a few years ago with no tx either time.        MRI l/s: "Lumbar disc degeneration as described above at L5-S1 greater than L4-5 with circumferential diffuse disc bulge at L5-S1 with posterior extrusion and small protrusion of the right subarticular zone."      Subjective     Pt reports her pain is better but she is still numb on the front of her leg. She felt very light and good after the pulling of her legs last time and requested PT performs distraction again. Patient with reports of some neck pain that comes usually with weather changes. (information provided via ).     Objective     Patient received individual therapy to increase strength, endurance, ROM, flexibility, posture and core stabilization with activities as follows:     Alejandra received therapeutic exercises to develop strength, endurance, ROM, flexibility, posture and core stabilization for 52 minutes including:     Bike x5 min  MET for R ant/L post rotated innominate 5x5" NP   MET for R outflare 2x5x5"  Seated ball roll outs 10" x3 ea direction  pallof press RTB x20 B  Calf stretch x1 min  Adduction squeeze 20x5"  TA contraction 20 x5"  Supine lat pull RTB x30  hooklying abduction 30x3" RTB  Piriformis st figure 4 3x30" B  Sciatic nerve glide R x20  DKTC 3x30"  DKTC with SB 20x3"      Alejandra received the " following manual therapy techniques for 8 minutes.     Lumbar traction on SB x5 min  Posterior R fibula mobilizations    Patient received MH to cervical spine for 10 minutes post tx    Written Home Exercises Provided: no new exercises provided this session  Pt demo good understanding of the education provided. Alejandra demonstrated good return demonstration of activities.     Education provided: ALEISHA Roberts verbalized good understanding of education provided.   No spiritual or educational barriers to learning identified.    Assessment     Patient tolerated tx well. Information regarding symptoms and response to treatment provided with  today. Patient entered tx with pelvic outflare, corrected with MET. Patient with reports of enjoying manual traction despite reports of numbness last session and requested manual technique today. After manual therapy, patient with reports of no numbness in her RLE. Patient will continue to benefit from skilled PT in order to decrease pain, increase strength/ROm, and improve functional mobility.       Plan   Continue with established Plan of Care towards PT goals.      Therapist: Jaclyn Donaldson, PT, DPT

## 2019-12-19 ENCOUNTER — CLINICAL SUPPORT (OUTPATIENT)
Dept: REHABILITATION | Facility: HOSPITAL | Age: 44
End: 2019-12-19
Attending: PSYCHIATRY & NEUROLOGY
Payer: COMMERCIAL

## 2019-12-19 DIAGNOSIS — R68.89 DECREASED STRENGTH, ENDURANCE, AND MOBILITY: ICD-10-CM

## 2019-12-19 DIAGNOSIS — M54.41 ACUTE BILATERAL LOW BACK PAIN WITH RIGHT-SIDED SCIATICA: ICD-10-CM

## 2019-12-19 DIAGNOSIS — R53.1 DECREASED STRENGTH, ENDURANCE, AND MOBILITY: ICD-10-CM

## 2019-12-19 DIAGNOSIS — Z74.09 DECREASED STRENGTH, ENDURANCE, AND MOBILITY: ICD-10-CM

## 2019-12-19 PROCEDURE — 97110 THERAPEUTIC EXERCISES: CPT | Mod: PN

## 2019-12-19 NOTE — PROGRESS NOTES
"                                                  Physical Therapy Daily Note     Date: 12/19/2019  Name: Alejandra Ellis  Clinic Number: [id number]  Diagnosis:   Encounter Diagnoses   Name Primary?    Acute bilateral low back pain with right-sided sciatica     Decreased strength, endurance, and mobility      Physician: Laura Floyd DO    Precautions: standard     Evaluation Date: 12/3/19  Start Time: 805  Stop Time: 852  Visit # authorized: 4/21  Authorization period: 11/7/20  Plan of care expiration: 2/3/20  MD referral: Reji Vazquez Jr., MD     Hx of present illness: Pain began insidiously about 6 months ago. She has hx of back pain a few years ago with no tx either time.        MRI l/s: "Lumbar disc degeneration as described above at L5-S1 greater than L4-5 with circumferential diffuse disc bulge at L5-S1 with posterior extrusion and small protrusion of the right subarticular zone."      Subjective     Pt reports she still has numbness but it is better than before. Asked about exercises for B heel pain that has been present for a couple months. She isn't sure if it's related to the numbness or not.  Pt reports she has to leave 10 minutes early. (information provided via ).     Objective     Patient received individual therapy to increase strength, endurance, ROM, flexibility, posture and core stabilization with activities as follows:     Alejandra received therapeutic exercises to develop strength, endurance, ROM, flexibility, posture and core stabilization for 37 minutes including:     Bike x5 min  MET for R ant/L post rotated innominate 5x5"    MET for R outflare 2x5x5"  Seated ball roll outs 10" x3 ea direction  pallof press RTB x20 B  Calf stretch x1 min  Adduction squeeze 20x5"  TA contraction 20 x5"  Supine lat pull RTB x30  hooklying abduction 30x3" RTB NP  Piriformis st figure 4 3x30" B  Sciatic nerve glide R x20 NP  DKTC 3x30"  DKTC with SB 20x3" NP      Alejandra received the following manual " therapy techniques for 10 minutes.     Lumbar traction on SB  Posterior R fibula mobilizations  R sacral PA mobilizations    Patient received MH to cervical spine for 10 minutes post tx    Written Home Exercises Provided: no new exercises provided this session  Pt demo good understanding of the education provided. Alejandra demonstrated good return demonstration of activities.     Education provided: ALEISHA Roberts verbalized good understanding of education provided.   No spiritual or educational barriers to learning identified.    Assessment     Patient tolerated tx well. Information regarding symptoms and response to treatment provided with  today. Patient entered tx with pelvic outflare and rotation-used MET to correct. Patient responding well to manual therapy techniques with reports of decreased numbness following. Patient will continue to benefit from skilled PT in order to decrease pain, increase strength/ROm, and improve functional mobility.       Plan   Continue with established Plan of Care towards PT goals.      Therapist: Jaclyn Donaldson, PT, DPT

## 2020-01-02 ENCOUNTER — TELEPHONE (OUTPATIENT)
Dept: PAIN MEDICINE | Facility: CLINIC | Age: 45
End: 2020-01-02

## 2020-01-02 NOTE — TELEPHONE ENCOUNTER
Reminded patient of Pain Management appointment scheduled for tomorrow at 0715 with Dr. Vazquez- verbal confirmation received.  Location information also provided.

## 2020-01-03 ENCOUNTER — OFFICE VISIT (OUTPATIENT)
Dept: PAIN MEDICINE | Facility: CLINIC | Age: 45
End: 2020-01-03
Payer: COMMERCIAL

## 2020-01-03 VITALS
SYSTOLIC BLOOD PRESSURE: 135 MMHG | WEIGHT: 121.38 LBS | RESPIRATION RATE: 20 BRPM | DIASTOLIC BLOOD PRESSURE: 66 MMHG | TEMPERATURE: 99 F | HEIGHT: 65 IN | OXYGEN SATURATION: 100 % | HEART RATE: 95 BPM | BODY MASS INDEX: 20.22 KG/M2

## 2020-01-03 DIAGNOSIS — M47.816 LUMBAR SPONDYLOSIS: ICD-10-CM

## 2020-01-03 DIAGNOSIS — M51.36 DDD (DEGENERATIVE DISC DISEASE), LUMBAR: ICD-10-CM

## 2020-01-03 DIAGNOSIS — M54.16 LUMBAR RADICULOPATHY: Primary | ICD-10-CM

## 2020-01-03 DIAGNOSIS — G56.03 BILATERAL CARPAL TUNNEL SYNDROME: ICD-10-CM

## 2020-01-03 PROCEDURE — 99999 PR PBB SHADOW E&M-EST. PATIENT-LVL III: ICD-10-PCS | Mod: PBBFAC,,, | Performed by: PAIN MEDICINE

## 2020-01-03 PROCEDURE — 3008F PR BODY MASS INDEX (BMI) DOCUMENTED: ICD-10-PCS | Mod: CPTII,S$GLB,, | Performed by: PAIN MEDICINE

## 2020-01-03 PROCEDURE — 99214 PR OFFICE/OUTPT VISIT, EST, LEVL IV, 30-39 MIN: ICD-10-PCS | Mod: S$GLB,,, | Performed by: PAIN MEDICINE

## 2020-01-03 PROCEDURE — 99214 OFFICE O/P EST MOD 30 MIN: CPT | Mod: S$GLB,,, | Performed by: PAIN MEDICINE

## 2020-01-03 PROCEDURE — 99999 PR PBB SHADOW E&M-EST. PATIENT-LVL III: CPT | Mod: PBBFAC,,, | Performed by: PAIN MEDICINE

## 2020-01-03 PROCEDURE — 3008F BODY MASS INDEX DOCD: CPT | Mod: CPTII,S$GLB,, | Performed by: PAIN MEDICINE

## 2020-01-03 RX ORDER — GABAPENTIN 300 MG/1
900 CAPSULE ORAL NIGHTLY
Qty: 90 CAPSULE | Refills: 5 | Status: SHIPPED | OUTPATIENT
Start: 2020-01-03 | End: 2023-10-24 | Stop reason: SDUPTHER

## 2020-01-03 NOTE — PROGRESS NOTES
Subjective:     Patient ID: Alejandra Ellis is a 44 y.o. female    Chief Complaint: Follow-up      Referred by: No ref. provider found      HPI:    Japanese speaking patient,  used during today's visit.    Interval History (1/3/20):  She returns today for follow up.  She reports that physical therapy has been helpful for the right-sided low back and lower extremity pain. She reports that the pain has somewhat improved but she still has significant numbness in that area she states that she takes gabapentin sporadically and mostly at night.  She denies any adverse effects of this medication and feels that is helpful.  Otherwise she denies any changes in the quality or location of her pain. She denies any new or worsening symptoms.    Initial Encounter (11/8/19):  Alejandra Ellis is a 44 y.o. female who presents today with chronic right-sided low back and lower extremity pain. This pain has been present for over 6 months.  No specific inciting event or injury noted.  The pain is located in the right lumbosacral region radiates the right lower extremity via the S1 dermatome.  Patient reports numbness and tingling in this area as well. She reports weakness in her right lower extremity.  She denies any bowel bladder dysfunction.  The pain is constant and worsened with activity.  Patient also states that she wakes in the morning with bilateral hand numbness.  The numbness is mainly located in the fingers and involves all 10 fingers..   This pain is described in detail below.    Physical Therapy:  Yes.    Non-pharmacologic Treatment:  Rest helps         · TENS?  No    Pain Medications:         · Currently taking:  Gabapentin 100 mg t.i.d p.r.n.    · Has tried in the past:      · Has not tried:  Opioids, NSAIDs, Tylenol, Muscle relaxants, TCAs, SNRIs, topical creams    Blood thinners:  None    Interventional Therapies:  None    Relevant Surgeries:  None    Affecting sleep?  Yes    Affecting daily activities?  yes    Depressive symptoms? no          · SI/HI? No    Work status: Employed     Pain Scores:    Best:      4/10  Worst:   10/10  Usually:  8/10  Today:   4/10    Review of Systems   Constitutional: Negative for activity change, appetite change, chills, fatigue, fever and unexpected weight change.   HENT: Negative for hearing loss.    Eyes: Negative for visual disturbance.   Respiratory: Negative for chest tightness and shortness of breath.    Cardiovascular: Negative for chest pain.   Gastrointestinal: Negative for abdominal pain, constipation, diarrhea, nausea and vomiting.   Genitourinary: Negative for difficulty urinating.   Musculoskeletal: Positive for back pain, gait problem and myalgias. Negative for neck pain.   Skin: Negative for rash.   Neurological: Positive for weakness and numbness. Negative for dizziness, light-headedness and headaches.   Psychiatric/Behavioral: Positive for sleep disturbance. Negative for hallucinations and suicidal ideas. The patient is not nervous/anxious.        History reviewed. No pertinent past medical history.    History reviewed. No pertinent surgical history.    Social History     Socioeconomic History    Marital status:      Spouse name: Not on file    Number of children: Not on file    Years of education: Not on file    Highest education level: Not on file   Occupational History    Not on file   Social Needs    Financial resource strain: Not on file    Food insecurity:     Worry: Not on file     Inability: Not on file    Transportation needs:     Medical: Not on file     Non-medical: Not on file   Tobacco Use    Smoking status: Never Smoker    Smokeless tobacco: Never Used   Substance and Sexual Activity    Alcohol use: No     Frequency: Never    Drug use: No    Sexual activity: Yes     Partners: Male     Birth control/protection: None   Lifestyle    Physical activity:     Days per week: Not on file     Minutes per session: Not on file    Stress: Not  "on file   Relationships    Social connections:     Talks on phone: Not on file     Gets together: Not on file     Attends Congregation service: Not on file     Active member of club or organization: Not on file     Attends meetings of clubs or organizations: Not on file     Relationship status: Not on file   Other Topics Concern    Not on file   Social History Narrative    Not on file       Review of patient's allergies indicates:  No Known Allergies    Current Outpatient Medications on File Prior to Visit   Medication Sig Dispense Refill    ergocalciferol (ERGOCALCIFEROL) 50,000 unit Cap take ONE CAPSULE every week  3    [DISCONTINUED] gabapentin (NEURONTIN) 100 MG capsule Take 1 capsule (100 mg total) by mouth 3 (three) times daily. 90 capsule 3     No current facility-administered medications on file prior to visit.        Objective:      /66 (BP Location: Left arm, Patient Position: Sitting, BP Method: Small (Automatic))   Pulse 95   Temp 99.4 °F (37.4 °C) (Oral)   Resp 20   Ht 5' 5" (1.651 m)   Wt 55.1 kg (121 lb 6.4 oz)   LMP 12/28/2019 (Approximate)   SpO2 100%   BMI 20.20 kg/m²     Exam:  GEN:  Well developed, well nourished.  No acute distress.   HEENT:  No trauma.  Mucous membranes moist.  Nares patent bilaterally.  PSYCH: Normal affect. Thought content appropriate.  CHEST:  Breathing symmetric.  No audible wheezing.  ABD: Soft, non-distended.  SKIN:  Warm, pink, dry.  No rash on exposed areas.    EXT:  No cyanosis, clubbing, or edema.  No color change or changes in nail or hair growth.  NEURO/MUSCULOSKELETAL:  Fully alert, oriented, and appropriate. Speech normal beatriz. No cranial nerve deficits.   Gait:  Normal.  No focal motor deficits.         Imaging:  Narrative     EXAMINATION:  MRI LUMBAR SPINE WITHOUT CONTRAST    CLINICAL HISTORY:  radicular low back pain; Radiculopathy, site unspecified    TECHNIQUE:  Multiplanar, multisequence MR images were acquired from the thoracolumbar " junction to the sacrum without the administration of contrast.    COMPARISON:  None.    FINDINGS:  Exam was somewhat limited by patient motion artifact.    Alignment: There is a large of normal lumbar lordosis with straightening of the lumbar spine    Vertebrae: Normal marrow signal. No fracture.  Modic type 3 changes are noted at the anterior portion of L5-S1. there are apparent hypoplastic ribs at L1.  Mild-to-moderate facet arthropathy is noted greatest at the lower lumbar spine.    Discs: Intervertebral disc height loss is noted at L4-5 and L5-S1 with desiccation at L4-5.  Additionally, there is apparent circumferential annular tear at the L5-S1 level.    Cord: Normal.  Conus terminates at L1-2.    Degenerative findings:    T12-L1: No disc bulge.  No evidence of neural foraminal or spinal canal narrowing or stenosis.    L1-L2: No disc bulge.  No evidence of neural foraminal or spinal canal narrowing or stenosis.    L2-L3: No disc bulge.  No evidence of neural foraminal or spinal canal stenosis.    L3-L4: No disc bulge.  No evidence of neural foraminal or spinal canal stenosis.    L4-L5: Moderate broad-based disc bulge with ventral thecal sac effacement, with minimal/mild neural foraminal narrowing with displacement without contact on the exiting nerve roots.    L5-S1: Significant, circumferential diffuse disc bulge with contained annular tear.  There is moderate posterior extrusion causing ventral thecal sac effacement with a small protrusion in the right subarticular zone.  Mild/moderate, right greater than left neural foraminal narrowing is noted with questionable contact of the right S1 exiting nerve root.    Paraspinal muscles & soft tissues: Unremarkable.   Impression       Lumbar disc degeneration as described above at L5-S1 greater than L4-5 with circumferential diffuse disc bulge at L5-S1 with posterior extrusion and small protrusion of the right subarticular zone.      Electronically signed by:  Satno Hillman  Date: 10/29/2019  Time: 10:05         Assessment:       Encounter Diagnoses   Name Primary?    Lumbar radiculopathy Yes    Bilateral carpal tunnel syndrome     Lumbar spondylosis     DDD (degenerative disc disease), lumbar          Plan:       Alejandra was seen today for follow-up.    Diagnoses and all orders for this visit:    Lumbar radiculopathy  -     gabapentin (NEURONTIN) 300 MG capsule; Take 3 capsules (900 mg total) by mouth every evening.    Bilateral carpal tunnel syndrome    Lumbar spondylosis  -     gabapentin (NEURONTIN) 300 MG capsule; Take 3 capsules (900 mg total) by mouth every evening.    DDD (degenerative disc disease), lumbar  -     gabapentin (NEURONTIN) 300 MG capsule; Take 3 capsules (900 mg total) by mouth every evening.        Alejandra Ellis is a 44 y.o. female with right-sided low back and lower extremity pain.  Pain appears to be most consistent with right S1 radiculopathy secondary to an L5-S1 disc protrusion.  Also with symptoms consistent with bilateral carpal tunnel syndrome.  EMG did show mild carpal tunnel syndrome bilaterally.    1.  Continue physical therapy/home exercise program.  2.  Increase gabapentin to 300 mg q.h.s..  Patient can gradually increase this to 900 mg q.h.s. as tolerated/needed.  3.  Return to clinic in 8 weeks.  At that time we will discuss efficacy of increase gabapentin dosage and physical therapy/home exercise program.  May consider right S1 transforaminal epidural steroid injection if appropriate.

## 2020-01-07 ENCOUNTER — OFFICE VISIT (OUTPATIENT)
Dept: OBSTETRICS AND GYNECOLOGY | Facility: CLINIC | Age: 45
End: 2020-01-07
Payer: COMMERCIAL

## 2020-01-07 ENCOUNTER — HOSPITAL ENCOUNTER (OUTPATIENT)
Dept: RADIOLOGY | Facility: HOSPITAL | Age: 45
Discharge: HOME | End: 2020-01-07
Attending: OBSTETRICS & GYNECOLOGY
Payer: COMMERCIAL

## 2020-01-07 ENCOUNTER — OFFICE VISIT (OUTPATIENT)
Dept: NEUROLOGY | Facility: CLINIC | Age: 45
End: 2020-01-07
Payer: COMMERCIAL

## 2020-01-07 VITALS
HEIGHT: 65 IN | DIASTOLIC BLOOD PRESSURE: 74 MMHG | SYSTOLIC BLOOD PRESSURE: 126 MMHG | HEART RATE: 93 BPM | WEIGHT: 119.06 LBS | BODY MASS INDEX: 19.83 KG/M2

## 2020-01-07 VITALS
WEIGHT: 121.25 LBS | SYSTOLIC BLOOD PRESSURE: 112 MMHG | DIASTOLIC BLOOD PRESSURE: 64 MMHG | BODY MASS INDEX: 20.2 KG/M2 | HEIGHT: 65 IN

## 2020-01-07 VITALS — WEIGHT: 121.25 LBS | BODY MASS INDEX: 20.2 KG/M2 | HEIGHT: 65 IN

## 2020-01-07 DIAGNOSIS — M54.16 LUMBAR RADICULOPATHY: Primary | ICD-10-CM

## 2020-01-07 DIAGNOSIS — Z01.419 WELL WOMAN EXAM WITH ROUTINE GYNECOLOGICAL EXAM: Primary | ICD-10-CM

## 2020-01-07 DIAGNOSIS — Z12.31 BREAST CANCER SCREENING BY MAMMOGRAM: ICD-10-CM

## 2020-01-07 PROCEDURE — 3008F PR BODY MASS INDEX (BMI) DOCUMENTED: ICD-10-PCS | Mod: CPTII,S$GLB,, | Performed by: PSYCHIATRY & NEUROLOGY

## 2020-01-07 PROCEDURE — 77063 BREAST TOMOSYNTHESIS BI: CPT | Mod: 26,,, | Performed by: RADIOLOGY

## 2020-01-07 PROCEDURE — 77067 SCR MAMMO BI INCL CAD: CPT | Mod: 26,,, | Performed by: RADIOLOGY

## 2020-01-07 PROCEDURE — 99999 PR PBB SHADOW E&M-EST. PATIENT-LVL III: CPT | Mod: PBBFAC,,, | Performed by: OBSTETRICS & GYNECOLOGY

## 2020-01-07 PROCEDURE — 3008F BODY MASS INDEX DOCD: CPT | Mod: CPTII,S$GLB,, | Performed by: PSYCHIATRY & NEUROLOGY

## 2020-01-07 PROCEDURE — 99214 PR OFFICE/OUTPT VISIT, EST, LEVL IV, 30-39 MIN: ICD-10-PCS | Mod: S$GLB,,, | Performed by: PSYCHIATRY & NEUROLOGY

## 2020-01-07 PROCEDURE — 99396 PREV VISIT EST AGE 40-64: CPT | Mod: S$GLB,,, | Performed by: OBSTETRICS & GYNECOLOGY

## 2020-01-07 PROCEDURE — 99396 PR PREVENTIVE VISIT,EST,40-64: ICD-10-PCS | Mod: S$GLB,,, | Performed by: OBSTETRICS & GYNECOLOGY

## 2020-01-07 PROCEDURE — 77063 MAMMO DIGITAL SCREENING BILAT WITH TOMOSYNTHESIS_CAD: ICD-10-PCS | Mod: 26,,, | Performed by: RADIOLOGY

## 2020-01-07 PROCEDURE — 99999 PR PBB SHADOW E&M-EST. PATIENT-LVL III: ICD-10-PCS | Mod: PBBFAC,,, | Performed by: OBSTETRICS & GYNECOLOGY

## 2020-01-07 PROCEDURE — 99999 PR PBB SHADOW E&M-EST. PATIENT-LVL III: ICD-10-PCS | Mod: PBBFAC,,, | Performed by: PSYCHIATRY & NEUROLOGY

## 2020-01-07 PROCEDURE — 77067 SCR MAMMO BI INCL CAD: CPT | Mod: TC

## 2020-01-07 PROCEDURE — 77067 MAMMO DIGITAL SCREENING BILAT WITH TOMOSYNTHESIS_CAD: ICD-10-PCS | Mod: 26,,, | Performed by: RADIOLOGY

## 2020-01-07 PROCEDURE — 99999 PR PBB SHADOW E&M-EST. PATIENT-LVL III: CPT | Mod: PBBFAC,,, | Performed by: PSYCHIATRY & NEUROLOGY

## 2020-01-07 PROCEDURE — 99214 OFFICE O/P EST MOD 30 MIN: CPT | Mod: S$GLB,,, | Performed by: PSYCHIATRY & NEUROLOGY

## 2020-01-07 RX ORDER — OMEPRAZOLE 40 MG/1
CAPSULE, DELAYED RELEASE ORAL
COMMUNITY
Start: 2019-12-20 | End: 2023-10-24 | Stop reason: SDUPTHER

## 2020-01-07 NOTE — PROGRESS NOTES
SUBJECTIVE:   Alejandra Ellis is a 44 y.o. female   for annual well woman exam. Patient's last menstrual period was 2019 (approximate)..  She has no unusual complaints.      Contraception: none, h/o infertility - ovarian factor  Menses are regular and monthly, denies BTB    Past Medical History:   Diagnosis Date    Lumbar radiculopathy      No past surgical history on file.  Social History     Socioeconomic History    Marital status:      Spouse name: Not on file    Number of children: Not on file    Years of education: Not on file    Highest education level: Not on file   Occupational History    Not on file   Social Needs    Financial resource strain: Not on file    Food insecurity:     Worry: Not on file     Inability: Not on file    Transportation needs:     Medical: Not on file     Non-medical: Not on file   Tobacco Use    Smoking status: Never Smoker    Smokeless tobacco: Never Used   Substance and Sexual Activity    Alcohol use: No     Frequency: Never    Drug use: No    Sexual activity: Yes     Partners: Male     Birth control/protection: None   Lifestyle    Physical activity:     Days per week: Not on file     Minutes per session: Not on file    Stress: Not on file   Relationships    Social connections:     Talks on phone: Not on file     Gets together: Not on file     Attends Sabianist service: Not on file     Active member of club or organization: Not on file     Attends meetings of clubs or organizations: Not on file     Relationship status: Not on file   Other Topics Concern    Not on file   Social History Narrative    Not on file     No family history on file.  OB History    Para Term  AB Living   1 0 0 0 1 0   SAB TAB Ectopic Multiple Live Births   1 0 0 0 0      # Outcome Date GA Lbr Patrick/2nd Weight Sex Delivery Anes PTL Lv   1 SAB               Obstetric Comments   Gynhx: regular every 2 months   Denies abnl pap, pap 2018 NEG/HPV NEG   Denies abnl MMG,  "         Current Outpatient Medications   Medication Sig Dispense Refill    ergocalciferol (ERGOCALCIFEROL) 50,000 unit Cap take ONE CAPSULE every week  3    gabapentin (NEURONTIN) 300 MG capsule Take 3 capsules (900 mg total) by mouth every evening. 90 capsule 5    omeprazole (PRILOSEC) 40 MG capsule        No current facility-administered medications for this visit.      Allergies: Patient has no known allergies.       ROS:  GENERAL: Denies weight gain or weight loss. Feeling well overall.   SKIN: Denies rash or lesions.   HEAD: Denies head injury or headache.   NODES: Denies enlarged lymph nodes.   CHEST: Denies chest pain or shortness of breath.   CARDIOVASCULAR: Denies palpitations or left sided chest pain.   ABDOMEN: No abdominal pain, constipation, diarrhea, nausea, vomiting or rectal bleeding.   URINARY: No frequency, dysuria, hematuria, or burning on urination.  REPRODUCTIVE: Denies vaginal discharge, abnormal vaginal bleeding, lesions, pelvic pain  BREASTS: The patient performs breast self-examination and denies pain, lumps, or nipple discharge.   HEMATOLOGIC: No easy bruisability or excessive bleeding.  MUSCULOSKELETAL: Denies joint pain or swelling.   NEUROLOGIC: Denies syncope or weakness.   PSYCHIATRIC: Denies depression, anxiety or mood swings.      OBJECTIVE:   /64 (BP Location: Left arm, Patient Position: Sitting, BP Method: Medium (Manual))   Ht 5' 5" (1.651 m)   Wt 55 kg (121 lb 4.1 oz)   LMP 12/28/2019 (Approximate)   BMI 20.18 kg/m²   The patient appears well, alert, oriented x 3, in no distress.  PSYCH:  Normal, full range of affect  NECK: negative, no thyromegaly, trachea midline  SKIN: normal, good color, good turgor and no acne, striae, hirsutism  BREAST EXAM: breasts appear normal, no suspicious masses, no skin or nipple changes or axillary nodes  ABDOMEN: soft, non-tender; bowel sounds normal; no masses,  no organomegaly and no hernias, masses, or " hepatosplenomegaly  GENITALIA: normal external genitalia, no erythema, no discharge  BLADDER: soft  URETHRA: normal appearing urethra with no masses, tenderness or lesions and normal urethra, normal urethral meatus  VAGINA: Normal  CERVIX: no lesions or cervical motion tenderness  UTERUS: normal size, contour, position, consistency, mobility, non-tender  ADNEXA: no mass, fullness, tenderness      ASSESSMENT:   1. Health maintenance  -pap UTD  -screening MMG scheduled today  -counseled on exercise and healthy diet, weight loss  -bone health:  Discussed Vitamin D and Calcium supplementation, weight bearing exercises  2.  Discussed safety at home/school/work, healthy and balanced diet, sleep hygiene, as well as violence/weapons exposure.   3.  rtc yearly for wwe

## 2020-01-07 NOTE — PROGRESS NOTES
Neurology Follow Up Note    Chief Complaint: follow up for S1 radiculopathy and trivial carpal tunnel syndrome    Interval History:  History was obtained using [employer] phone . Since last visit, patient saw Dr. Vazquez who increased her gabapentin. She states she is currently taking gabapentin 300mg at bedtime. She was advised to slowly taper up to 900mg at bedtime by Dr. Vazquez. She states she still gets radicular pain down her right leg which is improving on gabapentin. She denies having numbness or tingling in her hands. She states she gets aching pain at her right elbow at times. She has no further complaints.    Last Visit 11/5/19:  History was taken via [employer] phone .  Since last visit, patient states her pain feels the same as before. She is taking gabapentin 100mg at bedtime and tolerating this well. She had an MRI L Spine which shows:  L4-L5: Moderate broad-based disc bulge with ventral thecal sac effacement, with minimal/mild neural foraminal narrowing with displacement without contact on the exiting nerve roots.    L5-S1: Significant, circumferential diffuse disc bulge with contained annular tear.  There is moderate posterior extrusion causing ventral thecal sac effacement with a small protrusion in the right subarticular zone.  Mild/moderate, right greater than left neural foraminal narrowing is noted with questionable contact of the right S1 exiting nerve root.     This is likely the cause of her pain. She denies bowel or bladder problems, weakness or recent falls. She has no further complaints.         Initial Visit 10/21/19:  Alejandra Ellis is a 44 y.o. female with no significant pmhx who presents for further evaluation of radicular right leg pain. [employer]  was used via the phone line. She states for the past 6 months, she has had a gradual onset right buttock pain radiating down to her right ankle. She states at times she has associated low back pain. She states  her pain is usually a 6/10 with activity, but at times it worsens to a 10/10 pian. She states the pain feels like a cramping only in her right leg when walking. She does not feel the pain when sitting. She denies bowel or bladder problems. She denies symptoms in her left foot or in her hands. She denies any inciting injury. She has no further complaints.        Past Medical History:  Past Medical History:   Diagnosis Date    Lumbar radiculopathy        Past Surgical History:  History reviewed. No pertinent surgical history.    Social History:  Social History     Socioeconomic History    Marital status:      Spouse name: Not on file    Number of children: Not on file    Years of education: Not on file    Highest education level: Not on file   Occupational History    Not on file   Social Needs    Financial resource strain: Not on file    Food insecurity:     Worry: Not on file     Inability: Not on file    Transportation needs:     Medical: Not on file     Non-medical: Not on file   Tobacco Use    Smoking status: Never Smoker    Smokeless tobacco: Never Used   Substance and Sexual Activity    Alcohol use: No     Frequency: Never    Drug use: No    Sexual activity: Yes     Partners: Male     Birth control/protection: None   Lifestyle    Physical activity:     Days per week: Not on file     Minutes per session: Not on file    Stress: Not on file   Relationships    Social connections:     Talks on phone: Not on file     Gets together: Not on file     Attends Episcopal service: Not on file     Active member of club or organization: Not on file     Attends meetings of clubs or organizations: Not on file     Relationship status: Not on file   Other Topics Concern    Not on file   Social History Narrative    Not on file       Family History:  History reviewed. No pertinent family history.    Medications:  Current Outpatient Medications   Medication Sig Dispense Refill    ergocalciferol  (ERGOCALCIFEROL) 50,000 unit Cap take ONE CAPSULE every week  3    gabapentin (NEURONTIN) 300 MG capsule Take 3 capsules (900 mg total) by mouth every evening. 90 capsule 5    omeprazole (PRILOSEC) 40 MG capsule        No current facility-administered medications for this visit.        Allergies:  Review of patient's allergies indicates:  No Known Allergies    ROS:  A 12 point review of system was negative aside from pertinent positives and negatives as outlined above.    Physical Exam  Vitals:    01/07/20 1541   BP: 126/74   Pulse: 93       General: well nourished, well developed  Eyes: no scleral icterus   Nose: nasal turbinates intact  Neck: supple, ROM intact  Skin: no rash or ecchymosis  Joints: no swelling or erythema  Cardiac: regular rate and rhythm  Lungs: clear to auscultation bilaterally    Neuro:  Mental status: AAO x 3, no dysarthria, no aphasia, communicating appropriately  CN: PERRL, EOMI, VFF, V1-V3 sensation intact, no facial asymmetry, hearing grossly intact, tongue midline  Motor:   Deltoid R 5/5 L 5/5   Biceps R 5/5 L 5/5   Triceps R 5/5 L 5/5   Wrist flexion R 5/5 L 5/5   Wrist extension R 5/5 L 5/5   Finger abduction  R 5/5 L 5/5   Thumb abduction R 5/5 L 5/5      Hip flexion R 5/5 L 5/5   Knee extension R 5/5 L 5/5   Knee flexion R 5/5 L 5/5   Foot dorsiflexion R 5/5 L 5/5   Foot plantar flexion R 5/5 L 5/5   Foot inversion R 5/5 L 5/5   Foot eversion R 5/5 L 5/5      Normal bulk and tone     Reflexes: 2+ throughout, toes equivocal bilaterally  Sensory: intact to light touch, pinprick, vibration and position sense throughout  Coordination: no dysmetria on FTN  Gait: steady    Prior Imaging/Labs:  Reviewed      Assessment and Plan:    44 y.o. female with right S1 radiculopathy. NCS showed trivial bilateral carpal tunnel syndrome. Patient denies having numbness and tingling in her hands or symptoms of carpal tunnel syndrome today.    1. Lumbar radiculopathy  Further management as per   George  EMG/NCS showed evidence of a mild chronic RIGHT L5-S1 radiculopathy  Taper up on gabapentin as directed by Dr. Vazquez. She was advised to increase to 600mg at bedtime tonight  She was made aware of side effects of gabapentin and was advised to notify Dr. Vazquez if she experiences any.  She has a follow up with Dr. Vazquez in 2 months for consideration of NIKOS            Patient was advised to notify me for worsening symptoms. I will see patient back in 6 months or sooner if necessary.       Laura Floyd DO  [facility]  [address] [facility] [address]  [place] [id number]  [phone]

## 2020-03-06 ENCOUNTER — CLINICAL SUPPORT (OUTPATIENT)
Dept: FAMILY MEDICINE | Facility: CLINIC | Age: 45
End: 2020-03-06
Payer: COMMERCIAL

## 2020-03-06 ENCOUNTER — OFFICE VISIT (OUTPATIENT)
Dept: PAIN MEDICINE | Facility: CLINIC | Age: 45
End: 2020-03-06
Payer: COMMERCIAL

## 2020-03-06 VITALS
BODY MASS INDEX: 19.71 KG/M2 | DIASTOLIC BLOOD PRESSURE: 79 MMHG | HEART RATE: 95 BPM | WEIGHT: 118.31 LBS | HEIGHT: 65 IN | TEMPERATURE: 99 F | OXYGEN SATURATION: 100 % | SYSTOLIC BLOOD PRESSURE: 125 MMHG

## 2020-03-06 DIAGNOSIS — M51.36 DDD (DEGENERATIVE DISC DISEASE), LUMBAR: ICD-10-CM

## 2020-03-06 DIAGNOSIS — Z23 NEED FOR INFLUENZA VACCINATION: Primary | ICD-10-CM

## 2020-03-06 DIAGNOSIS — M47.816 LUMBAR SPONDYLOSIS: ICD-10-CM

## 2020-03-06 DIAGNOSIS — M54.16 LUMBAR RADICULOPATHY: Primary | ICD-10-CM

## 2020-03-06 PROCEDURE — 90471 IMMUNIZATION ADMIN: CPT | Mod: S$GLB,,, | Performed by: PAIN MEDICINE

## 2020-03-06 PROCEDURE — 3008F BODY MASS INDEX DOCD: CPT | Mod: CPTII,S$GLB,, | Performed by: PAIN MEDICINE

## 2020-03-06 PROCEDURE — 90686 IIV4 VACC NO PRSV 0.5 ML IM: CPT | Mod: S$GLB,,, | Performed by: PAIN MEDICINE

## 2020-03-06 PROCEDURE — 90686 FLU VACCINE (QUAD) GREATER THAN OR EQUAL TO 3YO PRESERVATIVE FREE IM: ICD-10-PCS | Mod: S$GLB,,, | Performed by: PAIN MEDICINE

## 2020-03-06 PROCEDURE — 99999 PR PBB SHADOW E&M-EST. PATIENT-LVL III: ICD-10-PCS | Mod: PBBFAC,,, | Performed by: PAIN MEDICINE

## 2020-03-06 PROCEDURE — 3008F PR BODY MASS INDEX (BMI) DOCUMENTED: ICD-10-PCS | Mod: CPTII,S$GLB,, | Performed by: PAIN MEDICINE

## 2020-03-06 PROCEDURE — 99999 PR PBB SHADOW E&M-EST. PATIENT-LVL III: CPT | Mod: PBBFAC,,, | Performed by: PAIN MEDICINE

## 2020-03-06 PROCEDURE — 90471 FLU VACCINE (QUAD) GREATER THAN OR EQUAL TO 3YO PRESERVATIVE FREE IM: ICD-10-PCS | Mod: S$GLB,,, | Performed by: PAIN MEDICINE

## 2020-03-06 PROCEDURE — 99214 OFFICE O/P EST MOD 30 MIN: CPT | Mod: S$GLB,,, | Performed by: PAIN MEDICINE

## 2020-03-06 PROCEDURE — 99499 NO LOS: ICD-10-PCS | Mod: S$GLB,,, | Performed by: PAIN MEDICINE

## 2020-03-06 PROCEDURE — 99499 UNLISTED E&M SERVICE: CPT | Mod: S$GLB,,, | Performed by: PAIN MEDICINE

## 2020-03-06 PROCEDURE — 99214 PR OFFICE/OUTPT VISIT, EST, LEVL IV, 30-39 MIN: ICD-10-PCS | Mod: S$GLB,,, | Performed by: PAIN MEDICINE

## 2020-03-06 NOTE — PROGRESS NOTES
In-Person  used: Ms. Ellis will be scheduled for Bilateral S1 TF NIKOS on 03/11/2020.  Reviewed the pre-procedure instructions listed below with her- copy also provided.  Instructed patient to notify clinic if she begins feeling ill, runs a fever, is prescribed antibiotics, and/or has any outpatient procedures within the two weeks leading up to this procedure.  Instructed patient to report to [facility], 2nd Floor Endoscopy Registration Desk.  All questions answered- patient verbalized understanding.   requested online for 1300 on procedure day.    Day of Procedure  - Ensure you have obtained an arrival time from the Pain Management Staff  o Procedure Area will call 1-3 days in advance with your arrival time.  Please check any voicemails received.  o If you arrive past your scheduled procedure time, you may be asked to reschedule your procedure.  - Ensure you have a  with you to remain present throughout your procedure for your safety.  o If you arrive without a responsible adult to stay with you and drive you home, you may be asked to reschedule your procedure.  - Take all of your prescribed medications (exceptions noted below) with a small amount of water  - This is NOT a fasting procedure, you may have a light meal before coming.  - Wear comfortable clothing (loose fitting pants).  - You may wear glasses, dentures, contact lenses, and/or hearing aids. Please remove all jewelry and metal hairpins.  - Notify the nurse during the intake process if you are allergic to any medications, if you are diabetic, or if you are not feeling well  - Contact the Pain Management Clinic with the following:  o A fever greater than 100° (degrees)   o Feel ill, have any type of infection, or are taking antibiotics now or within the two (2) weeks leading up to this procedure  o Have any outpatient procedures within the two (2) weeks leading up to this procedure (colonoscopy, major dental work,  etc.)    IF you are taking blood thinners: Only upon receiving clearance and notification from the Pain Management Department  7 Days Prior to Your Procedure:  - Stop taking Plavix/Clopridogrel, Effient/Prasugel, ASA  5 Days Prior to Your Procedure:  - Stop taking Coumadin/Warfarin.  An INR may be drawn prior to your procedure.  If labs are required, you will need to arrive earlier than your scheduled arrival time.  - Stop taking Pradaxa/Dabigatra,  Brilinta/ Ticagrelor  3 Days Prior to Your Procedure:  - Stop taking Xarelto/Rivaroxaban, Eliquis/Apixaban, Aggrenox/Dipyridamole, Reopro/Abciximab

## 2020-03-06 NOTE — H&P (VIEW-ONLY)
Subjective:     Patient ID: Alejandra Ellis is a 44 y.o. female    Chief Complaint: Back Pain and Hip Pain      Referred by: No ref. provider found      HPI:    Hebrew speaking patient,  used during today's visit.    Interval History (3/6/20):  She returns today for follow up.  She reports that her pain is unchanged in quality and location since last encounter.  She denies any new or worsening symptoms.  She states that gabapentin 300 mg q.h.s. has been helpful for the right lower extremity pain but only minimally.  She has not tried taking higher doses of this medication.  She reports that this medication does make her tired.  She is interested in interventional procedures if appropriate.      Interval History (1/3/20):  She returns today for follow up.  She reports that physical therapy has been helpful for the right-sided low back and lower extremity pain. She reports that the pain has somewhat improved but she still has significant numbness in that area she states that she takes gabapentin sporadically and mostly at night.  She denies any adverse effects of this medication and feels that is helpful.  Otherwise she denies any changes in the quality or location of her pain. She denies any new or worsening symptoms.    Initial Encounter (11/8/19):  Alejandra Ellis is a 44 y.o. female who presents today with chronic right-sided low back and lower extremity pain. This pain has been present for over 6 months.  No specific inciting event or injury noted.  The pain is located in the right lumbosacral region radiates the right lower extremity via the S1 dermatome.  Patient reports numbness and tingling in this area as well. She reports weakness in her right lower extremity.  She denies any bowel bladder dysfunction.  The pain is constant and worsened with activity.  Patient also states that she wakes in the morning with bilateral hand numbness.  The numbness is mainly located in the fingers and involves all 10  fingers..   This pain is described in detail below.    Physical Therapy:  Yes.    Non-pharmacologic Treatment:  Rest helps         · TENS?  No    Pain Medications:         · Currently taking:  Gabapentin 300 mg q.h.s.    · Has tried in the past:      · Has not tried:  Opioids, NSAIDs, Tylenol, Muscle relaxants, TCAs, SNRIs, topical creams    Blood thinners:  None    Interventional Therapies:  None    Relevant Surgeries:  None    Affecting sleep?  Yes    Affecting daily activities? yes    Depressive symptoms? no          · SI/HI? No    Work status: Employed     Pain Scores:    Best:      4/10  Worst:   10/10  Usually:  8/10  Today:   5/10    Review of Systems   Constitutional: Negative for activity change, appetite change, chills, fatigue, fever and unexpected weight change.   HENT: Negative for hearing loss.    Eyes: Negative for visual disturbance.   Respiratory: Negative for chest tightness and shortness of breath.    Cardiovascular: Negative for chest pain.   Gastrointestinal: Negative for abdominal pain, constipation, diarrhea, nausea and vomiting.   Genitourinary: Negative for difficulty urinating.   Musculoskeletal: Positive for back pain, gait problem and myalgias. Negative for neck pain.   Skin: Negative for rash.   Neurological: Positive for weakness and numbness. Negative for dizziness, light-headedness and headaches.   Psychiatric/Behavioral: Positive for sleep disturbance. Negative for hallucinations and suicidal ideas. The patient is not nervous/anxious.        Past Medical History:   Diagnosis Date    Lumbar radiculopathy        History reviewed. No pertinent surgical history.    Social History     Socioeconomic History    Marital status:      Spouse name: Not on file    Number of children: Not on file    Years of education: Not on file    Highest education level: Not on file   Occupational History    Not on file   Social Needs    Financial resource strain: Not on file    Food  "insecurity:     Worry: Not on file     Inability: Not on file    Transportation needs:     Medical: Not on file     Non-medical: Not on file   Tobacco Use    Smoking status: Never Smoker    Smokeless tobacco: Never Used   Substance and Sexual Activity    Alcohol use: No     Frequency: Never    Drug use: No    Sexual activity: Yes     Partners: Male     Birth control/protection: None   Lifestyle    Physical activity:     Days per week: Not on file     Minutes per session: Not on file    Stress: Not on file   Relationships    Social connections:     Talks on phone: Not on file     Gets together: Not on file     Attends Rastafari service: Not on file     Active member of club or organization: Not on file     Attends meetings of clubs or organizations: Not on file     Relationship status: Not on file   Other Topics Concern    Not on file   Social History Narrative    Not on file       Review of patient's allergies indicates:  No Known Allergies    Current Outpatient Medications on File Prior to Visit   Medication Sig Dispense Refill    ergocalciferol (ERGOCALCIFEROL) 50,000 unit Cap take ONE CAPSULE every week  3    gabapentin (NEURONTIN) 300 MG capsule Take 3 capsules (900 mg total) by mouth every evening. 90 capsule 5    omeprazole (PRILOSEC) 40 MG capsule        No current facility-administered medications on file prior to visit.        Objective:      /79 (BP Location: Right arm, Patient Position: Sitting, BP Method: Medium (Automatic))   Pulse 95   Temp 98.5 °F (36.9 °C) (Oral)   Ht 5' 5" (1.651 m)   Wt 53.7 kg (118 lb 4.8 oz)   LMP 02/28/2020   SpO2 100%   BMI 19.69 kg/m²     Exam:  GEN:  Well developed, well nourished.  No acute distress.   HEENT:  No trauma.  Mucous membranes moist.  Nares patent bilaterally.  PSYCH: Normal affect. Thought content appropriate.  CHEST:  Breathing symmetric.  No audible wheezing.  ABD: Soft, non-distended.  SKIN:  Warm, pink, dry.  No rash on exposed " areas.    EXT:  No cyanosis, clubbing, or edema.  No color change or changes in nail or hair growth.  NEURO/MUSCULOSKELETAL:  Fully alert, oriented, and appropriate. Speech normal beatriz. No cranial nerve deficits.   Gait:  Normal.  No focal motor deficits.         Imaging:  Narrative     EXAMINATION:  MRI LUMBAR SPINE WITHOUT CONTRAST    CLINICAL HISTORY:  radicular low back pain; Radiculopathy, site unspecified    TECHNIQUE:  Multiplanar, multisequence MR images were acquired from the thoracolumbar junction to the sacrum without the administration of contrast.    COMPARISON:  None.    FINDINGS:  Exam was somewhat limited by patient motion artifact.    Alignment: There is a large of normal lumbar lordosis with straightening of the lumbar spine    Vertebrae: Normal marrow signal. No fracture.  Modic type 3 changes are noted at the anterior portion of L5-S1. there are apparent hypoplastic ribs at L1.  Mild-to-moderate facet arthropathy is noted greatest at the lower lumbar spine.    Discs: Intervertebral disc height loss is noted at L4-5 and L5-S1 with desiccation at L4-5.  Additionally, there is apparent circumferential annular tear at the L5-S1 level.    Cord: Normal.  Conus terminates at L1-2.    Degenerative findings:    T12-L1: No disc bulge.  No evidence of neural foraminal or spinal canal narrowing or stenosis.    L1-L2: No disc bulge.  No evidence of neural foraminal or spinal canal narrowing or stenosis.    L2-L3: No disc bulge.  No evidence of neural foraminal or spinal canal stenosis.    L3-L4: No disc bulge.  No evidence of neural foraminal or spinal canal stenosis.    L4-L5: Moderate broad-based disc bulge with ventral thecal sac effacement, with minimal/mild neural foraminal narrowing with displacement without contact on the exiting nerve roots.    L5-S1: Significant, circumferential diffuse disc bulge with contained annular tear.  There is moderate posterior extrusion causing ventral thecal sac  effacement with a small protrusion in the right subarticular zone.  Mild/moderate, right greater than left neural foraminal narrowing is noted with questionable contact of the right S1 exiting nerve root.    Paraspinal muscles & soft tissues: Unremarkable.   Impression       Lumbar disc degeneration as described above at L5-S1 greater than L4-5 with circumferential diffuse disc bulge at L5-S1 with posterior extrusion and small protrusion of the right subarticular zone.      Electronically signed by: Santo Hillman  Date: 10/29/2019  Time: 10:05         Assessment:       Encounter Diagnoses   Name Primary?    Lumbar radiculopathy Yes    Lumbar spondylosis     DDD (degenerative disc disease), lumbar          Plan:       Alejandra was seen today for back pain and hip pain.    Diagnoses and all orders for this visit:    Lumbar radiculopathy  -     X-Ray Lumbar Spine Ap Lateral w/Flex Ext; Future    Lumbar spondylosis  -     X-Ray Lumbar Spine Ap Lateral w/Flex Ext; Future    DDD (degenerative disc disease), lumbar  -     X-Ray Lumbar Spine Ap Lateral w/Flex Ext; Future        Alejandra Rhonda is a 44 y.o. female with right-sided low back and lower extremity pain.  Pain appears to be most consistent with right S1 radiculopathy secondary to an L5-S1 disc protrusion.  Also with symptoms consistent with bilateral carpal tunnel syndrome.  EMG did show mild carpal tunnel syndrome bilaterally.    1.  Continue physical therapy/home exercise program.  2.  Schedule for bilateral S1 transforaminal epidural steroid injection.  3.  Gradually increase gabapentin to 900 mg q.h.s. as tolerated/needed.  4.  Return to clinic 2 weeks after procedure to discuss results.  May consider neurosurgery referral if injection and medication did not provide adequate relief.

## 2020-03-06 NOTE — PROGRESS NOTES
Subjective:     Patient ID: Alejandra Ellis is a 44 y.o. female    Chief Complaint: Back Pain and Hip Pain      Referred by: No ref. provider found      HPI:    Yakut speaking patient,  used during today's visit.    Interval History (3/6/20):  She returns today for follow up.  She reports that her pain is unchanged in quality and location since last encounter.  She denies any new or worsening symptoms.  She states that gabapentin 300 mg q.h.s. has been helpful for the right lower extremity pain but only minimally.  She has not tried taking higher doses of this medication.  She reports that this medication does make her tired.  She is interested in interventional procedures if appropriate.      Interval History (1/3/20):  She returns today for follow up.  She reports that physical therapy has been helpful for the right-sided low back and lower extremity pain. She reports that the pain has somewhat improved but she still has significant numbness in that area she states that she takes gabapentin sporadically and mostly at night.  She denies any adverse effects of this medication and feels that is helpful.  Otherwise she denies any changes in the quality or location of her pain. She denies any new or worsening symptoms.    Initial Encounter (11/8/19):  Alejandra Ellis is a 44 y.o. female who presents today with chronic right-sided low back and lower extremity pain. This pain has been present for over 6 months.  No specific inciting event or injury noted.  The pain is located in the right lumbosacral region radiates the right lower extremity via the S1 dermatome.  Patient reports numbness and tingling in this area as well. She reports weakness in her right lower extremity.  She denies any bowel bladder dysfunction.  The pain is constant and worsened with activity.  Patient also states that she wakes in the morning with bilateral hand numbness.  The numbness is mainly located in the fingers and involves all 10  fingers..   This pain is described in detail below.    Physical Therapy:  Yes.    Non-pharmacologic Treatment:  Rest helps         · TENS?  No    Pain Medications:         · Currently taking:  Gabapentin 300 mg q.h.s.    · Has tried in the past:      · Has not tried:  Opioids, NSAIDs, Tylenol, Muscle relaxants, TCAs, SNRIs, topical creams    Blood thinners:  None    Interventional Therapies:  None    Relevant Surgeries:  None    Affecting sleep?  Yes    Affecting daily activities? yes    Depressive symptoms? no          · SI/HI? No    Work status: Employed     Pain Scores:    Best:      4/10  Worst:   10/10  Usually:  8/10  Today:   5/10    Review of Systems   Constitutional: Negative for activity change, appetite change, chills, fatigue, fever and unexpected weight change.   HENT: Negative for hearing loss.    Eyes: Negative for visual disturbance.   Respiratory: Negative for chest tightness and shortness of breath.    Cardiovascular: Negative for chest pain.   Gastrointestinal: Negative for abdominal pain, constipation, diarrhea, nausea and vomiting.   Genitourinary: Negative for difficulty urinating.   Musculoskeletal: Positive for back pain, gait problem and myalgias. Negative for neck pain.   Skin: Negative for rash.   Neurological: Positive for weakness and numbness. Negative for dizziness, light-headedness and headaches.   Psychiatric/Behavioral: Positive for sleep disturbance. Negative for hallucinations and suicidal ideas. The patient is not nervous/anxious.        Past Medical History:   Diagnosis Date    Lumbar radiculopathy        History reviewed. No pertinent surgical history.    Social History     Socioeconomic History    Marital status:      Spouse name: Not on file    Number of children: Not on file    Years of education: Not on file    Highest education level: Not on file   Occupational History    Not on file   Social Needs    Financial resource strain: Not on file    Food  "insecurity:     Worry: Not on file     Inability: Not on file    Transportation needs:     Medical: Not on file     Non-medical: Not on file   Tobacco Use    Smoking status: Never Smoker    Smokeless tobacco: Never Used   Substance and Sexual Activity    Alcohol use: No     Frequency: Never    Drug use: No    Sexual activity: Yes     Partners: Male     Birth control/protection: None   Lifestyle    Physical activity:     Days per week: Not on file     Minutes per session: Not on file    Stress: Not on file   Relationships    Social connections:     Talks on phone: Not on file     Gets together: Not on file     Attends Adventism service: Not on file     Active member of club or organization: Not on file     Attends meetings of clubs or organizations: Not on file     Relationship status: Not on file   Other Topics Concern    Not on file   Social History Narrative    Not on file       Review of patient's allergies indicates:  No Known Allergies    Current Outpatient Medications on File Prior to Visit   Medication Sig Dispense Refill    ergocalciferol (ERGOCALCIFEROL) 50,000 unit Cap take ONE CAPSULE every week  3    gabapentin (NEURONTIN) 300 MG capsule Take 3 capsules (900 mg total) by mouth every evening. 90 capsule 5    omeprazole (PRILOSEC) 40 MG capsule        No current facility-administered medications on file prior to visit.        Objective:      /79 (BP Location: Right arm, Patient Position: Sitting, BP Method: Medium (Automatic))   Pulse 95   Temp 98.5 °F (36.9 °C) (Oral)   Ht 5' 5" (1.651 m)   Wt 53.7 kg (118 lb 4.8 oz)   LMP 02/28/2020   SpO2 100%   BMI 19.69 kg/m²     Exam:  GEN:  Well developed, well nourished.  No acute distress.   HEENT:  No trauma.  Mucous membranes moist.  Nares patent bilaterally.  PSYCH: Normal affect. Thought content appropriate.  CHEST:  Breathing symmetric.  No audible wheezing.  ABD: Soft, non-distended.  SKIN:  Warm, pink, dry.  No rash on exposed " areas.    EXT:  No cyanosis, clubbing, or edema.  No color change or changes in nail or hair growth.  NEURO/MUSCULOSKELETAL:  Fully alert, oriented, and appropriate. Speech normal beatriz. No cranial nerve deficits.   Gait:  Normal.  No focal motor deficits.         Imaging:  Narrative     EXAMINATION:  MRI LUMBAR SPINE WITHOUT CONTRAST    CLINICAL HISTORY:  radicular low back pain; Radiculopathy, site unspecified    TECHNIQUE:  Multiplanar, multisequence MR images were acquired from the thoracolumbar junction to the sacrum without the administration of contrast.    COMPARISON:  None.    FINDINGS:  Exam was somewhat limited by patient motion artifact.    Alignment: There is a large of normal lumbar lordosis with straightening of the lumbar spine    Vertebrae: Normal marrow signal. No fracture.  Modic type 3 changes are noted at the anterior portion of L5-S1. there are apparent hypoplastic ribs at L1.  Mild-to-moderate facet arthropathy is noted greatest at the lower lumbar spine.    Discs: Intervertebral disc height loss is noted at L4-5 and L5-S1 with desiccation at L4-5.  Additionally, there is apparent circumferential annular tear at the L5-S1 level.    Cord: Normal.  Conus terminates at L1-2.    Degenerative findings:    T12-L1: No disc bulge.  No evidence of neural foraminal or spinal canal narrowing or stenosis.    L1-L2: No disc bulge.  No evidence of neural foraminal or spinal canal narrowing or stenosis.    L2-L3: No disc bulge.  No evidence of neural foraminal or spinal canal stenosis.    L3-L4: No disc bulge.  No evidence of neural foraminal or spinal canal stenosis.    L4-L5: Moderate broad-based disc bulge with ventral thecal sac effacement, with minimal/mild neural foraminal narrowing with displacement without contact on the exiting nerve roots.    L5-S1: Significant, circumferential diffuse disc bulge with contained annular tear.  There is moderate posterior extrusion causing ventral thecal sac  effacement with a small protrusion in the right subarticular zone.  Mild/moderate, right greater than left neural foraminal narrowing is noted with questionable contact of the right S1 exiting nerve root.    Paraspinal muscles & soft tissues: Unremarkable.   Impression       Lumbar disc degeneration as described above at L5-S1 greater than L4-5 with circumferential diffuse disc bulge at L5-S1 with posterior extrusion and small protrusion of the right subarticular zone.      Electronically signed by: Santo Hillman  Date: 10/29/2019  Time: 10:05         Assessment:       Encounter Diagnoses   Name Primary?    Lumbar radiculopathy Yes    Lumbar spondylosis     DDD (degenerative disc disease), lumbar          Plan:       Alejandra was seen today for back pain and hip pain.    Diagnoses and all orders for this visit:    Lumbar radiculopathy  -     X-Ray Lumbar Spine Ap Lateral w/Flex Ext; Future    Lumbar spondylosis  -     X-Ray Lumbar Spine Ap Lateral w/Flex Ext; Future    DDD (degenerative disc disease), lumbar  -     X-Ray Lumbar Spine Ap Lateral w/Flex Ext; Future        Alejandra Rhonda is a 44 y.o. female with right-sided low back and lower extremity pain.  Pain appears to be most consistent with right S1 radiculopathy secondary to an L5-S1 disc protrusion.  Also with symptoms consistent with bilateral carpal tunnel syndrome.  EMG did show mild carpal tunnel syndrome bilaterally.    1.  Continue physical therapy/home exercise program.  2.  Schedule for bilateral S1 transforaminal epidural steroid injection.  3.  Gradually increase gabapentin to 900 mg q.h.s. as tolerated/needed.  4.  Return to clinic 2 weeks after procedure to discuss results.  May consider neurosurgery referral if injection and medication did not provide adequate relief.

## 2020-03-11 ENCOUNTER — HOSPITAL ENCOUNTER (OUTPATIENT)
Facility: HOSPITAL | Age: 45
Discharge: HOME | End: 2020-03-11
Attending: PAIN MEDICINE | Admitting: PAIN MEDICINE
Payer: COMMERCIAL

## 2020-03-11 VITALS
HEART RATE: 74 BPM | TEMPERATURE: 97 F | DIASTOLIC BLOOD PRESSURE: 80 MMHG | SYSTOLIC BLOOD PRESSURE: 141 MMHG | RESPIRATION RATE: 18 BRPM | OXYGEN SATURATION: 99 %

## 2020-03-11 DIAGNOSIS — M51.36 DDD (DEGENERATIVE DISC DISEASE), LUMBAR: Primary | ICD-10-CM

## 2020-03-11 LAB — B-HCG UR QL: NEGATIVE

## 2020-03-11 PROCEDURE — 25000003 PHARM REV CODE 250

## 2020-03-11 PROCEDURE — 64483 NJX AA&/STRD TFRM EPI L/S 1: CPT | Mod: 50,,, | Performed by: PAIN MEDICINE

## 2020-03-11 PROCEDURE — 25500020 PHARM REV CODE 255: Performed by: PAIN MEDICINE

## 2020-03-11 PROCEDURE — 64483 PR EPIDURAL INJ, ANES/STEROID, TRANSFORAMINAL, LUMB/SACR, SNGL LEVL: ICD-10-PCS | Mod: 50,,, | Performed by: PAIN MEDICINE

## 2020-03-11 PROCEDURE — 63600175 PHARM REV CODE 636 W HCPCS: Performed by: PAIN MEDICINE

## 2020-03-11 PROCEDURE — 64483 NJX AA&/STRD TFRM EPI L/S 1: CPT | Mod: 50 | Performed by: PAIN MEDICINE

## 2020-03-11 PROCEDURE — 81025 URINE PREGNANCY TEST: CPT

## 2020-03-11 PROCEDURE — 25000003 PHARM REV CODE 250: Performed by: PAIN MEDICINE

## 2020-03-11 RX ORDER — LIDOCAINE HYDROCHLORIDE 10 MG/ML
INJECTION, SOLUTION EPIDURAL; INFILTRATION; INTRACAUDAL; PERINEURAL
Status: DISCONTINUED
Start: 2020-03-11 | End: 2020-03-11 | Stop reason: HOSPADM

## 2020-03-11 RX ORDER — LIDOCAINE HYDROCHLORIDE 20 MG/ML
INJECTION, SOLUTION INFILTRATION; PERINEURAL
Status: DISCONTINUED
Start: 2020-03-11 | End: 2020-03-11 | Stop reason: HOSPADM

## 2020-03-11 RX ORDER — DEXAMETHASONE SODIUM PHOSPHATE 10 MG/ML
10 INJECTION INTRAMUSCULAR; INTRAVENOUS ONCE
Status: COMPLETED | OUTPATIENT
Start: 2020-03-11 | End: 2020-03-11

## 2020-03-11 RX ORDER — ALPRAZOLAM 0.5 MG/1
1 TABLET, ORALLY DISINTEGRATING ORAL ONCE AS NEEDED
Status: COMPLETED | OUTPATIENT
Start: 2020-03-11 | End: 2020-03-11

## 2020-03-11 RX ORDER — LIDOCAINE HYDROCHLORIDE 20 MG/ML
10 INJECTION, SOLUTION INFILTRATION; PERINEURAL ONCE
Status: COMPLETED | OUTPATIENT
Start: 2020-03-11 | End: 2020-03-11

## 2020-03-11 RX ORDER — LIDOCAINE HYDROCHLORIDE 10 MG/ML
1 INJECTION, SOLUTION EPIDURAL; INFILTRATION; INTRACAUDAL; PERINEURAL ONCE
Status: COMPLETED | OUTPATIENT
Start: 2020-03-11 | End: 2020-03-11

## 2020-03-11 RX ORDER — DEXAMETHASONE SODIUM PHOSPHATE 10 MG/ML
INJECTION INTRAMUSCULAR; INTRAVENOUS
Status: DISCONTINUED
Start: 2020-03-11 | End: 2020-03-11 | Stop reason: HOSPADM

## 2020-03-11 RX ORDER — ALPRAZOLAM 0.5 MG/1
TABLET, ORALLY DISINTEGRATING ORAL
Status: COMPLETED
Start: 2020-03-11 | End: 2020-03-11

## 2020-03-11 RX ADMIN — ALPRAZOLAM 1 MG: 0.5 TABLET, ORALLY DISINTEGRATING ORAL at 01:03

## 2020-03-11 NOTE — OP NOTE
Lumbar transforaminal NIKOS    Time-out taken to identify patient and procedure side prior to starting the procedure.   I attest that I have reviewed the patient's home medications prior to the procedure and no contraindication have been identified. I  re-evaluated the patient after the patient was positioned for the procedure in the procedure room immediately before the procedural time-out. The vital signs are current and represent the current state of the patient which has not significantly changed since the preprocedure assessment.                              Date of Service: 03/11/2020    PCP: Yandel Lui MD    Referring Physician:                                     PROCEDURE: Bilateral S1 transforaminal epidural steroid injection under fluoroscopy    REASON FOR PROCEDURE: Bilateral Lumbar radiculopathy [M54.16]  DDD (degenerative disc disease), lumbar [M51.36]    PHYSICIAN: Reji Vazquez MD  ASSISTANTS:None    MEDICATIONS INJECTED:  Preservative-free dexamethasone 10mg, Xylocaine 1% MPF 3-5ml. 3ml per level. Preservative free, sterile normal saline is used to get larger volume as needed.  LOCAL ANESTHETIC INJECTED:  Xylocaine 2% 3ml per site.    SEDATION MEDICATIONS: None  ESTIMATED BLOOD LOSS:  None.    COMPLICATIONS:  None.    TECHNIQUE:   Laying in a prone position, the patient was prepped and draped in the usual sterile fashion using ChloraPrep and fenestrated drape.  The area to be injected was determined under fluoroscopic guidance.  Local anesthetic was given by raising a wheel and going down to the hub of a 27-gauge 1.25 inch needle.  The 3.5inch 25-gauge spinal needle was introduced towards the transverse process of each above named nerve root level.  The needle was walked medially then hinged into the neural foramen.  Omnipaque was injected to confirm appropriate placement and that there was no vascular runoff.  The medication was then injected after applying negative pressure. The patient  tolerated the procedure well.        The patient was monitored after the procedure.  Patient was given post procedure and discharge instructions to follow at home.  We will see the patient back in two weeks or the patient may call to inform of status. The patient was discharged in a stable condition.

## 2020-03-11 NOTE — DISCHARGE INSTRUCTIONS

## 2020-03-11 NOTE — DISCHARGE SUMMARY
Discharge Diagnosis:Lumbar radiculopathy [M54.16]  DDD (degenerative disc disease), lumbar [M51.36]  Condition on Discharge: Stable.  Diet on Discharge: Same as before.  Activity: as per instruction sheet.  Discharge to: Home with a responsible adult.  Follow up: as per Discharge instructions

## 2020-03-18 NOTE — PROGRESS NOTES
Please inform pt of normal pap smear or thyroid nodules, no cervical lymphadenopathy  Pulmonary/Chest: clear to auscultation bilaterally- no wheezes, rales or rhonchi, normal air movement, no respiratory distress orretractions  Cardiovascular: normal rate, regular rhythm, normal S1 and S2, no murmurs, rubs, clicks, or gallops,distal pulses intact  Abdomen: soft, tender bilateral lower quadrants, non-distended, normal bowel sounds,  no rebound or guarding, nomasses or hernias noted, no hepatospleenomegaly  Extremities: no cyanosis, clubbing or edema of the lowerextremities  Musculoskeletal: No joint swelling or gross deformity   Neuro:  Alert, 5/5 strength globally and symmetrically,  normal speech, no focal findings or movement disorder noted, gait wnl  Psych:  Normal affect without evidence of depression or anxiety, insight and judgement are appropriate, memoryappears intact  Skin: warm and dry, no rash or erythema  Lymph:  No cervical, auricular or supraclavicular lymph nodes palpated    ASSESSMENT & PLAN  Antony Mitchell was seen today for cough. Diagnoses and all orders for this visit:    Lower abdominal pain  -     POCT Influenza A/B DNA (Alere i)    Acute upper respiratory infection  -     POCT Influenza A/B DNA (Alere i)    Fever, unspecified fever cause  -     POCT Influenza A/B DNA (Alere i)      - influenza testing negative  - patient acutely ill, febrile  - needs further workup in the ER setting, including UA, labs, possible chest xray and ABD CT  - report called to Akbar Lim RN    Return if symptoms worsen or fail to improve. Antony Mitchell received counseling on the following healthy behaviors: medication adherence  Reviewedprior labs and health maintenance. Continue current medications, diet and exercise. Discussed use, benefit, and side effects of prescribed medications. Barriers to medication compliance addressed. Patient given educationalmaterials - see patient instructions. All patient questions answered.   Patient voiced understanding.

## 2020-03-25 ENCOUNTER — TELEPHONE (OUTPATIENT)
Dept: PAIN MEDICINE | Facility: CLINIC | Age: 45
End: 2020-03-25

## 2020-03-25 NOTE — TELEPHONE ENCOUNTER
Spoke with [employer] Line Trisha ID number [id number] in regards to contacting pt to reschedule 3/27/2020. Ask  explained that our department isn't doing in person or procedure visits at this time . Would like to offer her a virtual visit or to reschedule at later date . Pt declined virtual  apt and would to reschedule. States she feels okay at the moment . Informed that if your pain has gone to the extreme , to contact us and resist going to ED or urgent care to prevent further risk . She verbalized understanding and agreed to follow up if any changes.

## 2021-09-27 ENCOUNTER — OFFICE VISIT (OUTPATIENT)
Dept: NEUROLOGY | Facility: CLINIC | Age: 46
End: 2021-09-27
Payer: COMMERCIAL

## 2021-09-27 VITALS
SYSTOLIC BLOOD PRESSURE: 142 MMHG | DIASTOLIC BLOOD PRESSURE: 80 MMHG | HEART RATE: 90 BPM | WEIGHT: 128.31 LBS | HEIGHT: 65 IN | BODY MASS INDEX: 21.38 KG/M2

## 2021-09-27 DIAGNOSIS — M54.16 LUMBAR RADICULOPATHY: Primary | ICD-10-CM

## 2021-09-27 DIAGNOSIS — M54.10 RADICULAR LEG PAIN: ICD-10-CM

## 2021-09-27 PROCEDURE — 99999 PR PBB SHADOW E&M-EST. PATIENT-LVL III: CPT | Mod: PBBFAC,,, | Performed by: NEUROLOGICAL SURGERY

## 2021-09-27 PROCEDURE — 3077F SYST BP >= 140 MM HG: CPT | Mod: CPTII,S$GLB,, | Performed by: NEUROLOGICAL SURGERY

## 2021-09-27 PROCEDURE — 3079F DIAST BP 80-89 MM HG: CPT | Mod: CPTII,S$GLB,, | Performed by: NEUROLOGICAL SURGERY

## 2021-09-27 PROCEDURE — 3008F BODY MASS INDEX DOCD: CPT | Mod: CPTII,S$GLB,, | Performed by: NEUROLOGICAL SURGERY

## 2021-09-27 PROCEDURE — 99999 PR PBB SHADOW E&M-EST. PATIENT-LVL III: ICD-10-PCS | Mod: PBBFAC,,, | Performed by: NEUROLOGICAL SURGERY

## 2021-09-27 PROCEDURE — 3077F PR MOST RECENT SYSTOLIC BLOOD PRESSURE >= 140 MM HG: ICD-10-PCS | Mod: CPTII,S$GLB,, | Performed by: NEUROLOGICAL SURGERY

## 2021-09-27 PROCEDURE — 99214 OFFICE O/P EST MOD 30 MIN: CPT | Mod: S$GLB,,, | Performed by: NEUROLOGICAL SURGERY

## 2021-09-27 PROCEDURE — 3079F PR MOST RECENT DIASTOLIC BLOOD PRESSURE 80-89 MM HG: ICD-10-PCS | Mod: CPTII,S$GLB,, | Performed by: NEUROLOGICAL SURGERY

## 2021-09-27 PROCEDURE — 99214 PR OFFICE/OUTPT VISIT, EST, LEVL IV, 30-39 MIN: ICD-10-PCS | Mod: S$GLB,,, | Performed by: NEUROLOGICAL SURGERY

## 2021-09-27 PROCEDURE — 3008F PR BODY MASS INDEX (BMI) DOCUMENTED: ICD-10-PCS | Mod: CPTII,S$GLB,, | Performed by: NEUROLOGICAL SURGERY

## 2021-09-28 ENCOUNTER — TELEPHONE (OUTPATIENT)
Dept: PAIN MEDICINE | Facility: CLINIC | Age: 46
End: 2021-09-28

## 2021-09-29 ENCOUNTER — OFFICE VISIT (OUTPATIENT)
Dept: PAIN MEDICINE | Facility: CLINIC | Age: 46
End: 2021-09-29
Payer: COMMERCIAL

## 2021-09-29 VITALS
BODY MASS INDEX: 21.45 KG/M2 | HEIGHT: 65 IN | WEIGHT: 128.75 LBS | RESPIRATION RATE: 18 BRPM | HEART RATE: 79 BPM | OXYGEN SATURATION: 98 % | SYSTOLIC BLOOD PRESSURE: 134 MMHG | TEMPERATURE: 98 F | DIASTOLIC BLOOD PRESSURE: 84 MMHG

## 2021-09-29 DIAGNOSIS — M47.816 LUMBAR SPONDYLOSIS: ICD-10-CM

## 2021-09-29 DIAGNOSIS — M25.561 CHRONIC PAIN OF BOTH KNEES: ICD-10-CM

## 2021-09-29 DIAGNOSIS — M17.0 OSTEOARTHRITIS OF BOTH KNEES, UNSPECIFIED OSTEOARTHRITIS TYPE: Primary | ICD-10-CM

## 2021-09-29 DIAGNOSIS — M51.36 DDD (DEGENERATIVE DISC DISEASE), LUMBAR: ICD-10-CM

## 2021-09-29 DIAGNOSIS — M25.562 CHRONIC PAIN OF BOTH KNEES: ICD-10-CM

## 2021-09-29 DIAGNOSIS — G89.29 CHRONIC PAIN OF BOTH KNEES: ICD-10-CM

## 2021-09-29 DIAGNOSIS — M54.16 LUMBAR RADICULOPATHY: ICD-10-CM

## 2021-09-29 PROCEDURE — 99214 PR OFFICE/OUTPT VISIT, EST, LEVL IV, 30-39 MIN: ICD-10-PCS | Mod: S$GLB,,, | Performed by: REGISTERED NURSE

## 2021-09-29 PROCEDURE — 99214 OFFICE O/P EST MOD 30 MIN: CPT | Mod: S$GLB,,, | Performed by: REGISTERED NURSE

## 2021-09-29 PROCEDURE — 1159F PR MEDICATION LIST DOCUMENTED IN MEDICAL RECORD: ICD-10-PCS | Mod: CPTII,S$GLB,, | Performed by: REGISTERED NURSE

## 2021-09-29 PROCEDURE — 1160F RVW MEDS BY RX/DR IN RCRD: CPT | Mod: CPTII,S$GLB,, | Performed by: REGISTERED NURSE

## 2021-09-29 PROCEDURE — 3075F SYST BP GE 130 - 139MM HG: CPT | Mod: CPTII,S$GLB,, | Performed by: REGISTERED NURSE

## 2021-09-29 PROCEDURE — 3008F PR BODY MASS INDEX (BMI) DOCUMENTED: ICD-10-PCS | Mod: CPTII,S$GLB,, | Performed by: REGISTERED NURSE

## 2021-09-29 PROCEDURE — 99999 PR PBB SHADOW E&M-EST. PATIENT-LVL III: CPT | Mod: PBBFAC,,, | Performed by: REGISTERED NURSE

## 2021-09-29 PROCEDURE — 3008F BODY MASS INDEX DOCD: CPT | Mod: CPTII,S$GLB,, | Performed by: REGISTERED NURSE

## 2021-09-29 PROCEDURE — 3075F PR MOST RECENT SYSTOLIC BLOOD PRESS GE 130-139MM HG: ICD-10-PCS | Mod: CPTII,S$GLB,, | Performed by: REGISTERED NURSE

## 2021-09-29 PROCEDURE — 1160F PR REVIEW ALL MEDS BY PRESCRIBER/CLIN PHARMACIST DOCUMENTED: ICD-10-PCS | Mod: CPTII,S$GLB,, | Performed by: REGISTERED NURSE

## 2021-09-29 PROCEDURE — 3079F DIAST BP 80-89 MM HG: CPT | Mod: CPTII,S$GLB,, | Performed by: REGISTERED NURSE

## 2021-09-29 PROCEDURE — 3079F PR MOST RECENT DIASTOLIC BLOOD PRESSURE 80-89 MM HG: ICD-10-PCS | Mod: CPTII,S$GLB,, | Performed by: REGISTERED NURSE

## 2021-09-29 PROCEDURE — 1159F MED LIST DOCD IN RCRD: CPT | Mod: CPTII,S$GLB,, | Performed by: REGISTERED NURSE

## 2021-09-29 PROCEDURE — 99999 PR PBB SHADOW E&M-EST. PATIENT-LVL III: ICD-10-PCS | Mod: PBBFAC,,, | Performed by: REGISTERED NURSE

## 2021-10-20 ENCOUNTER — TELEPHONE (OUTPATIENT)
Dept: PAIN MEDICINE | Facility: CLINIC | Age: 46
End: 2021-10-20

## 2022-01-24 ENCOUNTER — OFFICE VISIT (OUTPATIENT)
Dept: OBSTETRICS AND GYNECOLOGY | Facility: CLINIC | Age: 47
End: 2022-01-24
Payer: COMMERCIAL

## 2022-01-24 VITALS
BODY MASS INDEX: 21.57 KG/M2 | WEIGHT: 129.63 LBS | SYSTOLIC BLOOD PRESSURE: 130 MMHG | DIASTOLIC BLOOD PRESSURE: 80 MMHG

## 2022-01-24 DIAGNOSIS — Z12.31 BREAST CANCER SCREENING BY MAMMOGRAM: ICD-10-CM

## 2022-01-24 DIAGNOSIS — Z12.4 ENCOUNTER FOR PAPANICOLAOU SMEAR FOR CERVICAL CANCER SCREENING: ICD-10-CM

## 2022-01-24 DIAGNOSIS — Z01.419 WELL WOMAN EXAM WITH ROUTINE GYNECOLOGICAL EXAM: Primary | ICD-10-CM

## 2022-01-24 PROCEDURE — 3008F PR BODY MASS INDEX (BMI) DOCUMENTED: ICD-10-PCS | Mod: CPTII,S$GLB,, | Performed by: OBSTETRICS & GYNECOLOGY

## 2022-01-24 PROCEDURE — 3008F BODY MASS INDEX DOCD: CPT | Mod: CPTII,S$GLB,, | Performed by: OBSTETRICS & GYNECOLOGY

## 2022-01-24 PROCEDURE — 87624 HPV HI-RISK TYP POOLED RSLT: CPT | Performed by: OBSTETRICS & GYNECOLOGY

## 2022-01-24 PROCEDURE — 99999 PR PBB SHADOW E&M-EST. PATIENT-LVL III: CPT | Mod: PBBFAC,,, | Performed by: OBSTETRICS & GYNECOLOGY

## 2022-01-24 PROCEDURE — 99396 PR PREVENTIVE VISIT,EST,40-64: ICD-10-PCS | Mod: S$GLB,,, | Performed by: OBSTETRICS & GYNECOLOGY

## 2022-01-24 PROCEDURE — 99999 PR PBB SHADOW E&M-EST. PATIENT-LVL III: ICD-10-PCS | Mod: PBBFAC,,, | Performed by: OBSTETRICS & GYNECOLOGY

## 2022-01-24 PROCEDURE — 3079F DIAST BP 80-89 MM HG: CPT | Mod: CPTII,S$GLB,, | Performed by: OBSTETRICS & GYNECOLOGY

## 2022-01-24 PROCEDURE — 88175 CYTOPATH C/V AUTO FLUID REDO: CPT | Performed by: OBSTETRICS & GYNECOLOGY

## 2022-01-24 PROCEDURE — 3079F PR MOST RECENT DIASTOLIC BLOOD PRESSURE 80-89 MM HG: ICD-10-PCS | Mod: CPTII,S$GLB,, | Performed by: OBSTETRICS & GYNECOLOGY

## 2022-01-24 PROCEDURE — 3075F SYST BP GE 130 - 139MM HG: CPT | Mod: CPTII,S$GLB,, | Performed by: OBSTETRICS & GYNECOLOGY

## 2022-01-24 PROCEDURE — 3075F PR MOST RECENT SYSTOLIC BLOOD PRESS GE 130-139MM HG: ICD-10-PCS | Mod: CPTII,S$GLB,, | Performed by: OBSTETRICS & GYNECOLOGY

## 2022-01-24 PROCEDURE — 99396 PREV VISIT EST AGE 40-64: CPT | Mod: S$GLB,,, | Performed by: OBSTETRICS & GYNECOLOGY

## 2022-01-24 NOTE — PROGRESS NOTES
SUBJECTIVE:   Alejandra Ellis is a 46 y.o. female   for annual well woman exam. LMP 6 months ago. she has no unusual complaints.      Contraception: none, h/o infertility - ovarian factor  Now with irregular cycles and intermittent hot flashes    Past Medical History:   Diagnosis Date    Lumbar radiculopathy      Past Surgical History:   Procedure Laterality Date    EPIDURAL STEROID INJECTION Bilateral 3/11/2020    Procedure: Injection, Steroid, Epidural Transforaminal;  Surgeon: Reji Vazquez Jr., MD;  Location: [facility] ENDO;  Service: Pain Management;  Laterality: Bilateral;  Bilateral S1 TF NIKOS    Arrive @ 1300; No ATC or DM;  Requested online 3/6     Social History     Socioeconomic History    Marital status:    Tobacco Use    Smoking status: Never Smoker    Smokeless tobacco: Never Used   Substance and Sexual Activity    Alcohol use: No    Drug use: No    Sexual activity: Yes     Partners: Male     Birth control/protection: None     No family history on file.  OB History    Para Term  AB Living   1 0 0 0 1 0   SAB IAB Ectopic Multiple Live Births   1 0 0 0 0      # Outcome Date GA Lbr Patrick/2nd Weight Sex Delivery Anes PTL Lv   1 SAB               Obstetric Comments   Gynhx: regular every 2 months   Denies abnl pap, pap 2018 NEG/HPV NEG   Denies abnl MMG,          Current Outpatient Medications   Medication Sig Dispense Refill    ergocalciferol (ERGOCALCIFEROL) 50,000 unit Cap take ONE CAPSULE every week  3    gabapentin (NEURONTIN) 300 MG capsule Take 3 capsules (900 mg total) by mouth every evening. 90 capsule 5    omeprazole (PRILOSEC) 40 MG capsule        No current facility-administered medications for this visit.     Allergies: Patient has no known allergies.       ROS:  GENERAL: Denies weight gain or weight loss. Feeling well overall.   SKIN: Denies rash or lesions.   HEAD: Denies head injury or headache.   NODES: Denies enlarged lymph nodes.   CHEST:  Denies chest pain or shortness of breath.   CARDIOVASCULAR: Denies palpitations or left sided chest pain.   ABDOMEN: No abdominal pain, constipation, diarrhea, nausea, vomiting or rectal bleeding.   URINARY: No frequency, dysuria, hematuria, or burning on urination.  REPRODUCTIVE: Denies vaginal discharge, abnormal vaginal bleeding, lesions, pelvic pain  BREASTS: The patient performs breast self-examination and denies pain, lumps, or nipple discharge.   HEMATOLOGIC: No easy bruisability or excessive bleeding.  MUSCULOSKELETAL: Denies joint pain or swelling.   NEUROLOGIC: Denies syncope or weakness.   PSYCHIATRIC: Denies depression, anxiety or mood swings.      OBJECTIVE:   /80   Wt 58.8 kg (129 lb 10.1 oz)   BMI 21.57 kg/m²   The patient appears well, alert, oriented x 3, in no distress.  PSYCH:  Normal, full range of affect  NECK: negative, no thyromegaly, trachea midline  SKIN: normal, good color, good turgor and no acne, striae, hirsutism  BREAST EXAM: breasts appear normal, no suspicious masses, no skin or nipple changes or axillary nodes  ABDOMEN: soft, non-tender; bowel sounds normal; no masses,  no organomegaly and no hernias, masses, or hepatosplenomegaly  GENITALIA: normal external genitalia, no erythema, no discharge  BLADDER: soft  URETHRA: normal appearing urethra with no masses, tenderness or lesions and normal urethra, normal urethral meatus  VAGINA: Normal  CERVIX: no lesions or cervical motion tenderness  UTERUS: normal size, contour, position, consistency, mobility, non-tender  ADNEXA: no mass, fullness, tenderness      ASSESSMENT:   1. Health maintenance  -pap done. Discussed ASCCP guideline screening every 3 - 5 years.   -screening MMG ordered  -counseled on exercise and healthy diet, weight loss  -bone health:  Discussed Vitamin D and Calcium supplementation, weight bearing exercises  2.  Discussed safety at home/school/work, healthy and balanced diet, sleep hygiene, as well as  violence/weapons exposure.

## 2022-01-27 LAB
HPV HR 12 DNA SPEC QL NAA+PROBE: NEGATIVE
HPV16 AG SPEC QL: NEGATIVE
HPV18 DNA SPEC QL NAA+PROBE: NEGATIVE

## 2022-01-28 LAB
FINAL PATHOLOGIC DIAGNOSIS: NORMAL
Lab: NORMAL

## 2022-09-28 ENCOUNTER — TELEPHONE (OUTPATIENT)
Dept: OBSTETRICS AND GYNECOLOGY | Facility: CLINIC | Age: 47
End: 2022-09-28
Payer: COMMERCIAL

## 2022-09-28 DIAGNOSIS — Z12.31 BREAST CANCER SCREENING BY MAMMOGRAM: Primary | ICD-10-CM

## 2022-09-28 NOTE — TELEPHONE ENCOUNTER
----- Message from Atif Tate MA sent at 9/28/2022  2:00 PM CDT -----    ----- Message -----  From: Arturo Peñaloza  Sent: 9/28/2022   1:11 PM CDT  To: Yazmin Quintanilla Staff    Type: Patient Call Back    Who called:Son/And    What is the request in detail: Pt is requesting mammogram orders    Can the clinic reply by [facility]? no     Would the patient rather a call back or a response via [facility]? Call back    Best call back number: [phone] (home)       Additional Information

## 2022-09-28 NOTE — TELEPHONE ENCOUNTER
----- Message from Chava Orona Mai, MD sent at 2022  3:45 PM CDT -----  There was an order placed before but it . New order placed.  Please have pt schedule asap or else order will  again    ----- Message -----  From: Atif Tate MA  Sent: 2022   2:01 PM CDT  To: Chava Orona Mai, MD      ----- Message -----  From: Arturo Peñaloza  Sent: 2022   1:11 PM CDT  To: Yazmin Quintanilla Staff    Type: Patient Call Back    Who called:Son/And    What is the request in detail: Pt is requesting mammogram orders    Can the clinic reply by [facility]? no     Would the patient rather a call back or a response via [facility]? Call back    Best call back number: [phone] (home)       Additional Information

## 2022-10-06 ENCOUNTER — HOSPITAL ENCOUNTER (OUTPATIENT)
Dept: RADIOLOGY | Facility: HOSPITAL | Age: 47
Discharge: HOME | End: 2022-10-06
Attending: OBSTETRICS & GYNECOLOGY
Payer: COMMERCIAL

## 2022-10-06 VITALS — HEIGHT: 65 IN | WEIGHT: 129 LBS | BODY MASS INDEX: 21.49 KG/M2

## 2022-10-06 DIAGNOSIS — Z12.31 BREAST CANCER SCREENING BY MAMMOGRAM: ICD-10-CM

## 2022-10-06 PROCEDURE — 77063 BREAST TOMOSYNTHESIS BI: CPT | Mod: TC,PO

## 2022-10-06 PROCEDURE — 77067 SCR MAMMO BI INCL CAD: CPT | Mod: 26,,, | Performed by: RADIOLOGY

## 2022-10-06 PROCEDURE — 77067 MAMMO DIGITAL SCREENING BILAT WITH TOMO: ICD-10-PCS | Mod: 26,,, | Performed by: RADIOLOGY

## 2022-10-06 PROCEDURE — 77063 BREAST TOMOSYNTHESIS BI: CPT | Mod: 26,,, | Performed by: RADIOLOGY

## 2022-10-06 PROCEDURE — 77063 MAMMO DIGITAL SCREENING BILAT WITH TOMO: ICD-10-PCS | Mod: 26,,, | Performed by: RADIOLOGY

## 2023-10-13 NOTE — TELEPHONE ENCOUNTER
Detail Level: Zone Message left reminding patient of Pain Management appointment scheduled for tomorrow at 0730 with Dr. Vazquez.  Location information also included.  Language line was used.     Detail Level: Generalized Detail Level: Detailed Prescription Strength Graduated Compression Stockings Recommendations: The patient was counseled that prescription strength graduated compression stockings should be worn for all waking hours. They will follow up with a venous specialist to monitor graduated compression stocking usage and their symptoms.

## 2023-10-24 ENCOUNTER — OFFICE VISIT (OUTPATIENT)
Dept: INTERNAL MEDICINE | Facility: CLINIC | Age: 48
End: 2023-10-24
Attending: FAMILY MEDICINE
Payer: COMMERCIAL

## 2023-10-24 VITALS
OXYGEN SATURATION: 99 % | BODY MASS INDEX: 19.98 KG/M2 | SYSTOLIC BLOOD PRESSURE: 110 MMHG | HEIGHT: 65 IN | HEART RATE: 90 BPM | WEIGHT: 119.94 LBS | DIASTOLIC BLOOD PRESSURE: 60 MMHG

## 2023-10-24 DIAGNOSIS — M54.16 LUMBAR RADICULOPATHY: ICD-10-CM

## 2023-10-24 DIAGNOSIS — M47.816 LUMBAR SPONDYLOSIS: ICD-10-CM

## 2023-10-24 DIAGNOSIS — M51.36 DDD (DEGENERATIVE DISC DISEASE), LUMBAR: ICD-10-CM

## 2023-10-24 DIAGNOSIS — M25.539 PAIN IN WRIST, UNSPECIFIED LATERALITY: Primary | ICD-10-CM

## 2023-10-24 PROCEDURE — 1159F MED LIST DOCD IN RCRD: CPT | Mod: CPTII,S$GLB,, | Performed by: FAMILY MEDICINE

## 2023-10-24 PROCEDURE — 99204 PR OFFICE/OUTPT VISIT, NEW, LEVL IV, 45-59 MIN: ICD-10-PCS | Mod: S$GLB,,, | Performed by: FAMILY MEDICINE

## 2023-10-24 PROCEDURE — 3074F PR MOST RECENT SYSTOLIC BLOOD PRESSURE < 130 MM HG: ICD-10-PCS | Mod: CPTII,S$GLB,, | Performed by: FAMILY MEDICINE

## 2023-10-24 PROCEDURE — 3008F BODY MASS INDEX DOCD: CPT | Mod: CPTII,S$GLB,, | Performed by: FAMILY MEDICINE

## 2023-10-24 PROCEDURE — 3008F PR BODY MASS INDEX (BMI) DOCUMENTED: ICD-10-PCS | Mod: CPTII,S$GLB,, | Performed by: FAMILY MEDICINE

## 2023-10-24 PROCEDURE — 3074F SYST BP LT 130 MM HG: CPT | Mod: CPTII,S$GLB,, | Performed by: FAMILY MEDICINE

## 2023-10-24 PROCEDURE — 1159F PR MEDICATION LIST DOCUMENTED IN MEDICAL RECORD: ICD-10-PCS | Mod: CPTII,S$GLB,, | Performed by: FAMILY MEDICINE

## 2023-10-24 PROCEDURE — 3078F DIAST BP <80 MM HG: CPT | Mod: CPTII,S$GLB,, | Performed by: FAMILY MEDICINE

## 2023-10-24 PROCEDURE — 1160F PR REVIEW ALL MEDS BY PRESCRIBER/CLIN PHARMACIST DOCUMENTED: ICD-10-PCS | Mod: CPTII,S$GLB,, | Performed by: FAMILY MEDICINE

## 2023-10-24 PROCEDURE — 99204 OFFICE O/P NEW MOD 45 MIN: CPT | Mod: S$GLB,,, | Performed by: FAMILY MEDICINE

## 2023-10-24 PROCEDURE — 99999 PR PBB SHADOW E&M-EST. PATIENT-LVL III: ICD-10-PCS | Mod: PBBFAC,,, | Performed by: FAMILY MEDICINE

## 2023-10-24 PROCEDURE — 1160F RVW MEDS BY RX/DR IN RCRD: CPT | Mod: CPTII,S$GLB,, | Performed by: FAMILY MEDICINE

## 2023-10-24 PROCEDURE — 3078F PR MOST RECENT DIASTOLIC BLOOD PRESSURE < 80 MM HG: ICD-10-PCS | Mod: CPTII,S$GLB,, | Performed by: FAMILY MEDICINE

## 2023-10-24 PROCEDURE — 99999 PR PBB SHADOW E&M-EST. PATIENT-LVL III: CPT | Mod: PBBFAC,,, | Performed by: FAMILY MEDICINE

## 2023-10-24 RX ORDER — OMEPRAZOLE 40 MG/1
40 CAPSULE, DELAYED RELEASE ORAL EVERY MORNING
Qty: 90 CAPSULE | Refills: 3 | Status: SHIPPED | OUTPATIENT
Start: 2023-10-24

## 2023-10-24 RX ORDER — NABUMETONE 500 MG/1
500 TABLET, FILM COATED ORAL 2 TIMES DAILY
Qty: 60 TABLET | Refills: 3 | Status: SHIPPED | OUTPATIENT
Start: 2023-10-24 | End: 2023-11-23

## 2023-10-24 RX ORDER — GABAPENTIN 300 MG/1
900 CAPSULE ORAL NIGHTLY
Qty: 90 CAPSULE | Refills: 3 | Status: SHIPPED | OUTPATIENT
Start: 2023-10-24 | End: 2023-12-26

## 2023-10-24 NOTE — PROGRESS NOTES
"Today's visit was performed with the use of a medically certified     CHIEF COMPLAINT:  Medication refill    HISTORY OF PRESENT ILLNESS: The patient is a generally healthy 48 year-old Armenian female.  The patient has de Quervain tendonitis of the right thumb.  She is right-hand dominant.  She needs some medications refilled.   The patient needs to establish a primary care physician.      REVIEW OF SYSTEMS:  GENERAL: No fever, chills, fatigability or weight loss.  SKIN: No rashes, itching or changes in color or texture of skin.  HEAD: No headaches or recent head trauma.  EYES: Visual acuity fine. No photophobia, ocular pain or diplopia.  EARS: Denies ear pain, discharge or vertigo.  NOSE: No loss of smell, no epistaxis or postnasal drip.  MOUTH & THROAT: No hoarseness or change in voice. No excessive gum bleeding.  NODES: Denies swollen glands.  CHEST: Denies DONOVAN, cyanosis, wheezing, cough and sputum production.  CARDIOVASCULAR: Denies chest pain, PND, orthopnea or reduced exercise tolerance.  ABDOMEN: Appetite fine. No weight loss. Denies diarrhea, abdominal pain, hematemesis or blood in stool.  URINARY: No flank pain, dysuria or hematuria.  PERIPHERAL VASCULAR: No claudication or cyanosis.  MUSCULOSKELETAL: No joint stiffness or swelling. Denies back pain.  Except as above  NEUROLOGIC: No history of seizures, paralysis, alteration of gait or coordination.    SOCIAL HISTORY: The patient does not smoke.  The patient does not drink.  She is .    PHYSICAL EXAMINATION:   Blood pressure 110/60, pulse 90, height 5' 5" (1.651 m), weight 54.4 kg (119 lb 14.9 oz), SpO2 99 %.  APPEARANCE: Well nourished, well developed, in no acute distress.    HEAD: Normocephalic, atraumatic.  EYES: PERRL. EOMI.  Conjunctivae without injection and  anicteric  NOSE: Mucosa pink. Airway clear.  MOUTH & THROAT: No tonsillar enlargement. No pharyngeal erythema or exudate. No stridor.  NECK: Supple.   NODES: No cervical, " axillary or inguinal lymph node enlargement.  CHEST: Lungs clear to auscultation.  No retractions are noted.  No rales or rhonchi are present.  CARDIOVASCULAR: Normal S1, S2. No rubs, murmurs or gallops.  ABDOMEN: Bowel sounds normal. Not distended. Soft. No tenderness or masses.  No ascites is noted.  MUSCULOSKELETAL:  There is no clubbing, cyanosis, or edema of the extremities x4.  There is full range of motion of the lumbar spine.  There is full range of motion of the extremities x4.  There is no deformity noted.  Right-sided de Quervain tendonitis noted  NEUROLOGIC:       Normal speech development.      Hearing normal.      Normal gait.      Motor and sensory exams grossly normal.  PSYCHIATRIC: Patient is alert and oriented x3.  Thought processes are all normal.  There is no homicidality.  There is no suicidality.  There is no evidence of psychosis.    LABORATORY/RADIOLOGY:   Chart reviewed.      ASSESSMENT:   Low back pain  De Quervain tendonitis    PLAN:  Medications refilled  Relafen  New PCP setup

## 2023-12-26 ENCOUNTER — OFFICE VISIT (OUTPATIENT)
Dept: INTERNAL MEDICINE | Facility: CLINIC | Age: 48
End: 2023-12-26
Payer: COMMERCIAL

## 2023-12-26 ENCOUNTER — LAB VISIT (OUTPATIENT)
Dept: LAB | Facility: OTHER | Age: 48
End: 2023-12-26
Attending: STUDENT IN AN ORGANIZED HEALTH CARE EDUCATION/TRAINING PROGRAM
Payer: COMMERCIAL

## 2023-12-26 VITALS
BODY MASS INDEX: 20.57 KG/M2 | SYSTOLIC BLOOD PRESSURE: 140 MMHG | WEIGHT: 123.44 LBS | HEIGHT: 65 IN | DIASTOLIC BLOOD PRESSURE: 86 MMHG | HEART RATE: 80 BPM | OXYGEN SATURATION: 99 %

## 2023-12-26 DIAGNOSIS — Z11.59 NEED FOR HEPATITIS C SCREENING TEST: ICD-10-CM

## 2023-12-26 DIAGNOSIS — E55.9 VITAMIN D DEFICIENCY: ICD-10-CM

## 2023-12-26 DIAGNOSIS — M65.4 TENOSYNOVITIS, DE QUERVAIN: ICD-10-CM

## 2023-12-26 DIAGNOSIS — Z11.4 SCREENING FOR HIV (HUMAN IMMUNODEFICIENCY VIRUS): ICD-10-CM

## 2023-12-26 DIAGNOSIS — Z12.31 ENCOUNTER FOR SCREENING MAMMOGRAM FOR MALIGNANT NEOPLASM OF BREAST: ICD-10-CM

## 2023-12-26 DIAGNOSIS — Z00.00 PREVENTATIVE HEALTH CARE: Primary | ICD-10-CM

## 2023-12-26 DIAGNOSIS — K21.9 CHRONIC GERD: ICD-10-CM

## 2023-12-26 DIAGNOSIS — Z00.00 PREVENTATIVE HEALTH CARE: ICD-10-CM

## 2023-12-26 LAB
25(OH)D3+25(OH)D2 SERPL-MCNC: 34 NG/ML (ref 30–96)
ALBUMIN SERPL BCP-MCNC: 4.3 G/DL (ref 3.5–5.2)
ALP SERPL-CCNC: 73 U/L (ref 55–135)
ALT SERPL W/O P-5'-P-CCNC: 16 U/L (ref 10–44)
ANION GAP SERPL CALC-SCNC: 9 MMOL/L (ref 8–16)
AST SERPL-CCNC: 21 U/L (ref 10–40)
BASOPHILS # BLD AUTO: 0.06 K/UL (ref 0–0.2)
BASOPHILS NFR BLD: 0.9 % (ref 0–1.9)
BILIRUB SERPL-MCNC: 0.8 MG/DL (ref 0.1–1)
BUN SERPL-MCNC: 13 MG/DL (ref 6–20)
CALCIUM SERPL-MCNC: 9.6 MG/DL (ref 8.7–10.5)
CHLORIDE SERPL-SCNC: 106 MMOL/L (ref 95–110)
CHOLEST SERPL-MCNC: 182 MG/DL (ref 120–199)
CHOLEST/HDLC SERPL: 3 {RATIO} (ref 2–5)
CO2 SERPL-SCNC: 28 MMOL/L (ref 23–29)
CREAT SERPL-MCNC: 0.7 MG/DL (ref 0.5–1.4)
DIFFERENTIAL METHOD: NORMAL
EOSINOPHIL # BLD AUTO: 0.2 K/UL (ref 0–0.5)
EOSINOPHIL NFR BLD: 3.5 % (ref 0–8)
ERYTHROCYTE [DISTWIDTH] IN BLOOD BY AUTOMATED COUNT: 12.7 % (ref 11.5–14.5)
EST. GFR  (NO RACE VARIABLE): >60 ML/MIN/1.73 M^2
ESTIMATED AVG GLUCOSE: 100 MG/DL (ref 68–131)
GLUCOSE SERPL-MCNC: 98 MG/DL (ref 70–110)
HBA1C MFR BLD: 5.1 % (ref 4–5.6)
HCT VFR BLD AUTO: 42.7 % (ref 37–48.5)
HCV AB SERPL QL IA: NORMAL
HDLC SERPL-MCNC: 60 MG/DL (ref 40–75)
HDLC SERPL: 33 % (ref 20–50)
HGB BLD-MCNC: 14 G/DL (ref 12–16)
HIV 1+2 AB+HIV1 P24 AG SERPL QL IA: NORMAL
IMM GRANULOCYTES # BLD AUTO: 0.02 K/UL (ref 0–0.04)
IMM GRANULOCYTES NFR BLD AUTO: 0.3 % (ref 0–0.5)
LDLC SERPL CALC-MCNC: 105.6 MG/DL (ref 63–159)
LYMPHOCYTES # BLD AUTO: 1.6 K/UL (ref 1–4.8)
LYMPHOCYTES NFR BLD: 23.4 % (ref 18–48)
MCH RBC QN AUTO: 28.9 PG (ref 27–31)
MCHC RBC AUTO-ENTMCNC: 32.8 G/DL (ref 32–36)
MCV RBC AUTO: 88 FL (ref 82–98)
MONOCYTES # BLD AUTO: 0.4 K/UL (ref 0.3–1)
MONOCYTES NFR BLD: 6.6 % (ref 4–15)
NEUTROPHILS # BLD AUTO: 4.3 K/UL (ref 1.8–7.7)
NEUTROPHILS NFR BLD: 65.3 % (ref 38–73)
NONHDLC SERPL-MCNC: 122 MG/DL
NRBC BLD-RTO: 0 /100 WBC
PLATELET # BLD AUTO: 305 K/UL (ref 150–450)
PMV BLD AUTO: 9.9 FL (ref 9.2–12.9)
POTASSIUM SERPL-SCNC: 4.3 MMOL/L (ref 3.5–5.1)
PROT SERPL-MCNC: 8.2 G/DL (ref 6–8.4)
RBC # BLD AUTO: 4.85 M/UL (ref 4–5.4)
SODIUM SERPL-SCNC: 143 MMOL/L (ref 136–145)
TRIGL SERPL-MCNC: 82 MG/DL (ref 30–150)
WBC # BLD AUTO: 6.63 K/UL (ref 3.9–12.7)

## 2023-12-26 PROCEDURE — 99999 PR PBB SHADOW E&M-EST. PATIENT-LVL IV: CPT | Mod: PBBFAC,,, | Performed by: STUDENT IN AN ORGANIZED HEALTH CARE EDUCATION/TRAINING PROGRAM

## 2023-12-26 PROCEDURE — 3079F PR MOST RECENT DIASTOLIC BLOOD PRESSURE 80-89 MM HG: ICD-10-PCS | Mod: CPTII,S$GLB,, | Performed by: STUDENT IN AN ORGANIZED HEALTH CARE EDUCATION/TRAINING PROGRAM

## 2023-12-26 PROCEDURE — 83036 HEMOGLOBIN GLYCOSYLATED A1C: CPT | Performed by: STUDENT IN AN ORGANIZED HEALTH CARE EDUCATION/TRAINING PROGRAM

## 2023-12-26 PROCEDURE — 3077F PR MOST RECENT SYSTOLIC BLOOD PRESSURE >= 140 MM HG: ICD-10-PCS | Mod: CPTII,S$GLB,, | Performed by: STUDENT IN AN ORGANIZED HEALTH CARE EDUCATION/TRAINING PROGRAM

## 2023-12-26 PROCEDURE — 80053 COMPREHEN METABOLIC PANEL: CPT | Performed by: STUDENT IN AN ORGANIZED HEALTH CARE EDUCATION/TRAINING PROGRAM

## 2023-12-26 PROCEDURE — 3008F PR BODY MASS INDEX (BMI) DOCUMENTED: ICD-10-PCS | Mod: CPTII,S$GLB,, | Performed by: STUDENT IN AN ORGANIZED HEALTH CARE EDUCATION/TRAINING PROGRAM

## 2023-12-26 PROCEDURE — 86803 HEPATITIS C AB TEST: CPT | Performed by: STUDENT IN AN ORGANIZED HEALTH CARE EDUCATION/TRAINING PROGRAM

## 2023-12-26 PROCEDURE — 36415 COLL VENOUS BLD VENIPUNCTURE: CPT | Performed by: STUDENT IN AN ORGANIZED HEALTH CARE EDUCATION/TRAINING PROGRAM

## 2023-12-26 PROCEDURE — 99214 OFFICE O/P EST MOD 30 MIN: CPT | Mod: S$GLB,,, | Performed by: STUDENT IN AN ORGANIZED HEALTH CARE EDUCATION/TRAINING PROGRAM

## 2023-12-26 PROCEDURE — 3079F DIAST BP 80-89 MM HG: CPT | Mod: CPTII,S$GLB,, | Performed by: STUDENT IN AN ORGANIZED HEALTH CARE EDUCATION/TRAINING PROGRAM

## 2023-12-26 PROCEDURE — 87389 HIV-1 AG W/HIV-1&-2 AB AG IA: CPT | Performed by: STUDENT IN AN ORGANIZED HEALTH CARE EDUCATION/TRAINING PROGRAM

## 2023-12-26 PROCEDURE — 85025 COMPLETE CBC W/AUTO DIFF WBC: CPT | Performed by: STUDENT IN AN ORGANIZED HEALTH CARE EDUCATION/TRAINING PROGRAM

## 2023-12-26 PROCEDURE — 80061 LIPID PANEL: CPT | Performed by: STUDENT IN AN ORGANIZED HEALTH CARE EDUCATION/TRAINING PROGRAM

## 2023-12-26 PROCEDURE — 99214 PR OFFICE/OUTPT VISIT, EST, LEVL IV, 30-39 MIN: ICD-10-PCS | Mod: S$GLB,,, | Performed by: STUDENT IN AN ORGANIZED HEALTH CARE EDUCATION/TRAINING PROGRAM

## 2023-12-26 PROCEDURE — 1159F MED LIST DOCD IN RCRD: CPT | Mod: CPTII,S$GLB,, | Performed by: STUDENT IN AN ORGANIZED HEALTH CARE EDUCATION/TRAINING PROGRAM

## 2023-12-26 PROCEDURE — 99999 PR PBB SHADOW E&M-EST. PATIENT-LVL IV: ICD-10-PCS | Mod: PBBFAC,,, | Performed by: STUDENT IN AN ORGANIZED HEALTH CARE EDUCATION/TRAINING PROGRAM

## 2023-12-26 PROCEDURE — 1159F PR MEDICATION LIST DOCUMENTED IN MEDICAL RECORD: ICD-10-PCS | Mod: CPTII,S$GLB,, | Performed by: STUDENT IN AN ORGANIZED HEALTH CARE EDUCATION/TRAINING PROGRAM

## 2023-12-26 PROCEDURE — 3077F SYST BP >= 140 MM HG: CPT | Mod: CPTII,S$GLB,, | Performed by: STUDENT IN AN ORGANIZED HEALTH CARE EDUCATION/TRAINING PROGRAM

## 2023-12-26 PROCEDURE — 1160F RVW MEDS BY RX/DR IN RCRD: CPT | Mod: CPTII,S$GLB,, | Performed by: STUDENT IN AN ORGANIZED HEALTH CARE EDUCATION/TRAINING PROGRAM

## 2023-12-26 PROCEDURE — 3008F BODY MASS INDEX DOCD: CPT | Mod: CPTII,S$GLB,, | Performed by: STUDENT IN AN ORGANIZED HEALTH CARE EDUCATION/TRAINING PROGRAM

## 2023-12-26 PROCEDURE — 82306 VITAMIN D 25 HYDROXY: CPT | Performed by: STUDENT IN AN ORGANIZED HEALTH CARE EDUCATION/TRAINING PROGRAM

## 2023-12-26 PROCEDURE — 1160F PR REVIEW ALL MEDS BY PRESCRIBER/CLIN PHARMACIST DOCUMENTED: ICD-10-PCS | Mod: CPTII,S$GLB,, | Performed by: STUDENT IN AN ORGANIZED HEALTH CARE EDUCATION/TRAINING PROGRAM

## 2023-12-26 RX ORDER — INFLUENZA A VIRUS A/GEORGIA/12/2022 CRV-167 (H1N1) ANTIGEN (MDCK CELL DERIVED, PROPIOLACTONE INACTIVATED), INFLUENZA A VIRUS A/DARWIN/11/2021 (H3N2) ANTIGEN (MDCK CELL DERIVED, PROPIOLACTONE INACTIVATED),INFLUENZA B VIRUS B/SINGAPORE/WUH4618/2021 ANTIGEN (MDCK CELL DERIVED, ROPIOLACTONE INACTIVATED),INFLUENZA B VIRUS B/SINGAPORE/INFTT-16-0610/2016 ANTIGEN (MDCK CELL DERIVED, PROPIOLACTONE INACTIVATED) 15; 15; 15; 15 UG/.5ML; UG/.5ML; UG/.5ML; UG/.5ML
INJECTION, SUSPENSION INTRAMUSCULAR
COMMUNITY
Start: 2023-09-29

## 2023-12-26 NOTE — PROGRESS NOTES
Subjective:       Patient ID: Alejandra Ellis is a 48 y.o. female.    Chief Complaint: Establish Care    HPI  Establishing. Utilized  [id number] for this encounter. Diet: yes, healthy to her. Exercise: daily, walks 2x per day, for 40mins-1 hour. Smokes/Vape/illicit drug use: denies. Alcohol use: denies. Medications/supplements: reviewed.    Hx of vitD deficiency-Taking weekly vitD supplementation for several years.     GERD-takes prilosec prn for epigastric discomfort, will take for a few days and symptoms resolve. Denies N/V, changes in stool, hematemesis, blood per rectum, decreased appetite, unintentional weight loss. Difficulty swallowing foods, or pain with swallowing foods. Denies family history of GI malignancy: yes.     DeQuervain tenosynovitis of right wrist-has not improved in the last few months, works as . Was off work for several weeks without improvement.     She is established with Ob/gyn for woman's health, sees Dr. Arce. Up to date on PAP: yes. Up to date on mammogram: due. She will schedule f/u for [employer].    Tests to Keep You Healthy    Mammogram: ORDERED BUT NOT SCHEDULED  Colon Cancer Screening: Met on 7/18/2023  Cervical Cancer Screening: Met on 1/24/2022      Social History     Social History Narrative    Not on file       History reviewed. No pertinent family history.    Current Outpatient Medications:     ergocalciferol (ERGOCALCIFEROL) 50,000 unit Cap, take ONE CAPSULE every week, Disp: , Rfl: 3    FLUCELVAX QUAD 0643-8831 60 mcg (15 mcg x 4)/0.5 mL Susp, , Disp: , Rfl:     omeprazole (PRILOSEC) 40 MG capsule, Take 1 capsule (40 mg total) by mouth every morning., Disp: 90 capsule, Rfl: 3    Review of Systems   Constitutional:  Negative for chills and fever.   Respiratory:  Negative for cough.    Gastrointestinal:  Negative for nausea and vomiting.   Musculoskeletal:  Negative for gait problem.   Neurological:  Negative for weakness.   Psychiatric/Behavioral:  Negative for behavioral  "problems.        Objective:   BP (!) 140/86   Pulse 80   Ht 5' 5" (1.651 m)   Wt 56 kg (123 lb 7.3 oz)   SpO2 99%   BMI 20.54 kg/m²      Physical Exam  Vitals and nursing note reviewed.   Constitutional:       General: She is not in acute distress.     Appearance: Normal appearance. She is not ill-appearing, toxic-appearing or diaphoretic.   Eyes:      General:         Right eye: No discharge.         Left eye: No discharge.      Conjunctiva/sclera: Conjunctivae normal.   Cardiovascular:      Rate and Rhythm: Normal rate and regular rhythm.   Pulmonary:      Effort: Pulmonary effort is normal. No respiratory distress.      Breath sounds: Normal breath sounds. No wheezing.   Abdominal:      Palpations: Abdomen is soft.      Tenderness: There is no abdominal tenderness. There is no guarding.   Neurological:      Mental Status: She is alert.   Psychiatric:         Behavior: Behavior normal.         Assessment & Plan   1. Preventative health care  -Reviewed overdue HCM vaccines with patient today.  - CBC Auto Differential; Future  - Comprehensive Metabolic Panel; Future  - Hemoglobin A1C; Future  - Lipid Panel; Future    2. Vitamin D deficiency  -screening below for continued need for weekly supplementation.  - Vitamin D 25 hydroxy; Future    3. Chronic GERD  -given chronicity of symptoms, GI referral for further eval.  - Ambulatory referral/consult to Gastroenterology; Future    4. Tenosynovitis, de Quervain  - Ambulatory referral/consult to Hand Surgery; Future    5. Encounter for screening mammogram for malignant neoplasm of breast  - Mammo Digital Screening Bilat w/ Bakari; Future    6. Need for hepatitis C screening test  - Hepatitis C Antibody; Future    7. Screening for HIV (human immunodeficiency virus)  - HIV 1/2 Ag/Ab (4th Gen); Future      Follow up in about 1 year (around 12/26/2024). Sooner pending labwork or prn.    Disclaimer:  This note may have been prepared using voice recognition software, it may " have not been extensively proofed, as such there could be errors within the text such as sound alike errors.

## 2023-12-27 ENCOUNTER — TELEPHONE (OUTPATIENT)
Dept: ORTHOPEDICS | Facility: CLINIC | Age: 48
End: 2023-12-27
Payer: COMMERCIAL

## 2023-12-27 ENCOUNTER — TELEPHONE (OUTPATIENT)
Dept: INTERNAL MEDICINE | Facility: CLINIC | Age: 48
End: 2023-12-27
Payer: COMMERCIAL

## 2023-12-28 NOTE — TELEPHONE ENCOUNTER
Spoke with Hong Konger  and she translated pt labs below:    Moise Gonzalez MD Physician SignedYesterday       Please call patient with use of [place] , notify labwork is normal.           Pt had no further questions.

## 2024-01-02 ENCOUNTER — HOSPITAL ENCOUNTER (OUTPATIENT)
Dept: RADIOLOGY | Facility: HOSPITAL | Age: 49
Discharge: HOME | End: 2024-01-02
Attending: STUDENT IN AN ORGANIZED HEALTH CARE EDUCATION/TRAINING PROGRAM
Payer: COMMERCIAL

## 2024-01-02 VITALS — BODY MASS INDEX: 20.57 KG/M2 | WEIGHT: 123.44 LBS | HEIGHT: 65 IN

## 2024-01-02 DIAGNOSIS — Z12.31 ENCOUNTER FOR SCREENING MAMMOGRAM FOR MALIGNANT NEOPLASM OF BREAST: ICD-10-CM

## 2024-01-02 PROCEDURE — 77067 SCR MAMMO BI INCL CAD: CPT | Mod: 26,,, | Performed by: RADIOLOGY

## 2024-01-02 PROCEDURE — 77063 BREAST TOMOSYNTHESIS BI: CPT | Mod: 26,,, | Performed by: RADIOLOGY

## 2024-01-02 PROCEDURE — 77067 SCR MAMMO BI INCL CAD: CPT | Mod: TC

## 2024-01-03 ENCOUNTER — OFFICE VISIT (OUTPATIENT)
Dept: GASTROENTEROLOGY | Facility: CLINIC | Age: 49
End: 2024-01-03
Payer: COMMERCIAL

## 2024-01-03 VITALS — BODY MASS INDEX: 20.17 KG/M2 | WEIGHT: 121.06 LBS | HEIGHT: 65 IN

## 2024-01-03 DIAGNOSIS — R10.13 EPIGASTRIC PAIN: Primary | ICD-10-CM

## 2024-01-03 DIAGNOSIS — R10.13 DYSPEPSIA: ICD-10-CM

## 2024-01-03 PROCEDURE — 3008F BODY MASS INDEX DOCD: CPT | Mod: CPTII,S$GLB,, | Performed by: NURSE PRACTITIONER

## 2024-01-03 PROCEDURE — 99999 PR PBB SHADOW E&M-EST. PATIENT-LVL III: CPT | Mod: PBBFAC,,, | Performed by: NURSE PRACTITIONER

## 2024-01-03 PROCEDURE — 1159F MED LIST DOCD IN RCRD: CPT | Mod: CPTII,S$GLB,, | Performed by: NURSE PRACTITIONER

## 2024-01-03 PROCEDURE — 99204 OFFICE O/P NEW MOD 45 MIN: CPT | Mod: S$GLB,,, | Performed by: NURSE PRACTITIONER

## 2024-01-03 NOTE — PATIENT INSTRUCTIONS
EGD Instructions    [facility] [address]  Clinic Office [phone]  Endoscopy Lab [phone]    You are scheduled for an EGD with Dr. Figueroa   on  1/9/2024  at [facility] in [place].    Check in at the Hospital -1st floor, Information desk.   Call [phone] to reschedule.    You cannot have anything to eat or drink after Midnight. You can brush your teeth with a sip of water.     An adult friend/family member must come with you to drive you home.  You cannot drive, take a taxi, Uber/Lyft or bus to leave the Endoscopy Center alone.  If you do not have someone to drive you home, your test will be cancelled.     Please follow the directions of your doctor if you take any pills that thin your blood. If you take these meds: Aggrenox, Brilinta, Effient, Eliquis, Lovenox, Plavix, Pletal, Pradaxa, Ticilid, Xarelto or Coumadin, let the doctor's office know.    Please hold any GLP-1 medications prior to the procedure: Dulaglutide Trulicity(hold week prior), Exenatide Byetta (hold the morning of procedure), Semaglutide Ozempic (hold week prior), Liraglutide Victoza, Saxenda(hold week prior), Lixisenatide Adlyxin (hold the morning of procedure), Semaglutide Rybelsus (hold the morning of procedure), Tirzepatide Mounjaro (hold week prior)     DON'T: On the morning of the test do not take insulin or pills for diabetes.     DO: On the morning of the test, do take any pills for blood pressure, heart, anti-rejection and or seizures with a small sip of water. Bring any inhalers with you.    Leave all valuables and jewelry at home. You will be at the hospital for 2-4 hours.    Call the Endoscopy department at [phone] with any questions about your procedure.    Thank you for choosing [facility].

## 2024-01-03 NOTE — PROGRESS NOTES
GASTROENTEROLOGY CLINIC NOTE    Chief Complaint: The primary encounter diagnosis was Epigastric pain. A diagnosis of Dyspepsia was also pertinent to this visit.  Referring provider/PCP: Moise Gonzalez MD    Alejandra Ellis is a 48 y.o. female who is a new patient to me with a PMH that's significant for GERD. [employer] service is being used for this office visit. She was referred to us by her PCP and is here today to establish care for epigastric pain and dyspepsia.   Symptoms have been occurring intermittently for over a year. Having epigastric discomfort that improves with omeprazole. She will take omeprazole for a few days which improves symptoms. Pain will eventually return after stopping omeprazole. Occurring few times a month.     Reflux: No  Dysphagia/Odynophagia: No  Nausea/Vomiting: No  Appetite Changes: No  Abdominal Pain: Epigastric discomfort  Bowel Habits: regular bowel movements. No change in bowel habits.  GI Bleeding: Denies hematochezia, hematemesis, melena, BRBPR, Black/tarry stool, coffee ground emesis  Unexplained Weight Loss: No     GLP-1s: No  NSAIDs: No  Anticoagulation or Antiplatelet: No    History of H.pylori:  no  H.pylori Treatment:  Prior Upper Endoscopy: Done in [place]. Few years ago. Reports inflammation in stomach.   Prior Colonoscopy:  Cologuard 7/2023 Negative  Family h/o Colon Cancer: No  Family h/o Crohn's Disease or Ulcerative Colitis: No  Family h/o Celiac Sprue: No  Abdominal Surgeries: no    Review of Systems   Constitutional:  Negative for weight loss.   HENT:  Negative for sore throat.    Eyes:  Negative for blurred vision.   Respiratory:  Negative for cough.    Cardiovascular:  Negative for chest pain.   Gastrointestinal:  Positive for abdominal pain (epigastric). Negative for blood in stool, constipation, diarrhea, heartburn, melena, nausea and vomiting.   Genitourinary:  Negative for dysuria.   Musculoskeletal:  Negative for myalgias.   Skin:  Negative for rash.  "  Neurological:  Negative for headaches.   Endo/Heme/Allergies:  Negative for environmental allergies.   Psychiatric/Behavioral:  Negative for suicidal ideas. The patient is not nervous/anxious.        Past Medical History: has a past medical history of Lumbar radiculopathy.    Past Surgical History: has a past surgical history that includes Epidural steroid injection (Bilateral, 3/11/2020).    Family History:family history is not on file.    Allergies: Review of patient's allergies indicates:  No Known Allergies    Social History: reports that she has never smoked. She has never used smokeless tobacco. She reports that she does not drink alcohol and does not use drugs.    Home medications:   Current Outpatient Medications on File Prior to Visit   Medication Sig Dispense Refill    ergocalciferol (ERGOCALCIFEROL) 50,000 unit Cap take ONE CAPSULE every week  3    FLUCELVAX QUAD 3339-1472 60 mcg (15 mcg x 4)/0.5 mL Susp       omeprazole (PRILOSEC) 40 MG capsule Take 1 capsule (40 mg total) by mouth every morning. 90 capsule 3     No current facility-administered medications on file prior to visit.       Vital signs:  Ht 5' 5" (1.651 m)   Wt 54.9 kg (121 lb 0.5 oz)   BMI 20.14 kg/m²     Physical Exam  Vitals reviewed.   Constitutional:       Appearance: Normal appearance.   HENT:      Head: Normocephalic.   Pulmonary:      Effort: Pulmonary effort is normal. No respiratory distress.   Abdominal:      General: There is no distension.      Palpations: Abdomen is soft.      Tenderness: There is no abdominal tenderness.   Skin:     General: Skin is warm.   Neurological:      Mental Status: She is alert and oriented to person, place, and time.   Psychiatric:         Behavior: Behavior normal.         Thought Content: Thought content normal.         Routine labs:  Lab Results   Component Value Date    WBC 6.63 12/26/2023    HGB 14.0 12/26/2023    HCT 42.7 12/26/2023    MCV 88 12/26/2023     12/26/2023     No results " "found for: "INR"  No results found for: "IRON", "FERRITIN", "TIBC", "FESATURATED"  Lab Results   Component Value Date     12/26/2023    K 4.3 12/26/2023     12/26/2023    CO2 28 12/26/2023    BUN 13 12/26/2023    CREATININE 0.7 12/26/2023     Lab Results   Component Value Date    ALBUMIN 4.3 12/26/2023    ALT 16 12/26/2023    AST 21 12/26/2023    ALKPHOS 73 12/26/2023    BILITOT 0.8 12/26/2023     No results found for: "GLUCOSE"  No results found for: "TSH"  Lab Results   Component Value Date    CALCIUM 9.6 12/26/2023       Imaging:      I have reviewed prior labs, imaging, and notes.      Assessment:  1. Epigastric pain    2. Dyspepsia      Intermittent epigastric discomfort that improves with omeprazole.   Discomfort returns when she stops taking omeprazole. EGD done few years ago in [place]; inflammation in stomach.   No known h/o H.pylori.     Plan:  Orders Placed This Encounter    Case Request Endoscopy: EGD (ESOPHAGOGASTRODUODENOSCOPY)     EGD. Consider biopsy for H.pylori.   Take omeprazole daily instead of as needed.       Plan of care discussed with patient who is in agreement and verbalized understanding.     I have explained the planned procedures to the patient.The risks, benefits and alternatives of the procedure were also explained in detail. Patient verbalized understanding, all questions were answered. The patient agrees to proceed as planned    Follow Up: HONORIO Mendoza, APRN,FNP-BC  [facility]    (Portions of this note were dictated using voice recognition software and may contain dictation related errors in spelling/grammar/syntax not found on text review)    "

## 2024-01-05 ENCOUNTER — TELEPHONE (OUTPATIENT)
Dept: ENDOSCOPY | Facility: HOSPITAL | Age: 49
End: 2024-01-05
Payer: COMMERCIAL

## 2024-01-05 NOTE — TELEPHONE ENCOUNTER
Attempted to complete pre-procedure call using  , ID #[id number], no answer.   Will call again on Monday, messages in portal not read.

## 2024-01-08 ENCOUNTER — TELEPHONE (OUTPATIENT)
Dept: INTERNAL MEDICINE | Facility: CLINIC | Age: 49
End: 2024-01-08
Payer: COMMERCIAL

## 2024-01-08 ENCOUNTER — TELEPHONE (OUTPATIENT)
Dept: ENDOSCOPY | Facility: HOSPITAL | Age: 49
End: 2024-01-08
Payer: COMMERCIAL

## 2024-01-08 NOTE — TELEPHONE ENCOUNTER
Attempted to complete pre-procedure call using  , ID #[id number], no answer and no voice mail.   Relative number listed is reportedly wrong number.  EGD exam on 01/09/24  Arrival yumiko 1245, may have clear liquids until 0945  No messages sent via portal have been read.

## 2024-01-08 NOTE — TELEPHONE ENCOUNTER
"Please utilize Austrian  to inform patient "The mammogram did not show anything concerning. This is good! We can repeat this in 1 year for continued breast cancer screening."    "

## 2024-01-10 ENCOUNTER — TELEPHONE (OUTPATIENT)
Dept: ORTHOPEDICS | Facility: CLINIC | Age: 49
End: 2024-01-10
Payer: COMMERCIAL

## 2024-01-10 NOTE — TELEPHONE ENCOUNTER
"Spoke with pt/[facility]  on 3-way stating message below:    Moise Gonzalez MD2 days ago     VW  Please utilize Chilean  to inform patient "The mammogram did not show anything concerning. This is good! We can repeat this in 1 year for continued breast cancer screening."            Note       Pt asked if she can schedule this appt in advance and pt was informed we're not able to schedule this soon as a CCC will call her then  "

## 2024-01-22 ENCOUNTER — TELEPHONE (OUTPATIENT)
Dept: ORTHOPEDICS | Facility: CLINIC | Age: 49
End: 2024-01-22
Payer: COMMERCIAL

## 2024-01-22 DIAGNOSIS — R52 PAIN: Primary | ICD-10-CM

## 2024-01-22 NOTE — TELEPHONE ENCOUNTER
Spoke to pt and reminded her of her appointment and x-ray. Pt voiced understanding and call ended.

## 2024-01-29 ENCOUNTER — HOSPITAL ENCOUNTER (OUTPATIENT)
Dept: RADIOLOGY | Facility: OTHER | Age: 49
Discharge: HOME | End: 2024-01-29
Attending: SPECIALIST/TECHNOLOGIST
Payer: COMMERCIAL

## 2024-01-29 ENCOUNTER — OFFICE VISIT (OUTPATIENT)
Dept: ORTHOPEDICS | Facility: CLINIC | Age: 49
End: 2024-01-29
Payer: COMMERCIAL

## 2024-01-29 VITALS — WEIGHT: 121.06 LBS | HEIGHT: 65 IN | BODY MASS INDEX: 20.17 KG/M2

## 2024-01-29 DIAGNOSIS — M65.4 TENOSYNOVITIS, DE QUERVAIN: ICD-10-CM

## 2024-01-29 DIAGNOSIS — R52 PAIN: ICD-10-CM

## 2024-01-29 DIAGNOSIS — M65.4 DE QUERVAIN'S TENOSYNOVITIS, RIGHT: Primary | ICD-10-CM

## 2024-01-29 DIAGNOSIS — G56.03 BILATERAL CARPAL TUNNEL SYNDROME: ICD-10-CM

## 2024-01-29 PROCEDURE — 99203 OFFICE O/P NEW LOW 30 MIN: CPT | Mod: 25,S$GLB,, | Performed by: SPECIALIST/TECHNOLOGIST

## 2024-01-29 PROCEDURE — 3008F BODY MASS INDEX DOCD: CPT | Mod: CPTII,S$GLB,, | Performed by: SPECIALIST/TECHNOLOGIST

## 2024-01-29 PROCEDURE — 73110 X-RAY EXAM OF WRIST: CPT | Mod: 26,RT,, | Performed by: INTERNAL MEDICINE

## 2024-01-29 PROCEDURE — 99999 PR PBB SHADOW E&M-EST. PATIENT-LVL III: CPT | Mod: PBBFAC,,, | Performed by: SPECIALIST/TECHNOLOGIST

## 2024-01-29 PROCEDURE — 73110 X-RAY EXAM OF WRIST: CPT | Mod: TC,FY,RT

## 2024-01-29 PROCEDURE — 1159F MED LIST DOCD IN RCRD: CPT | Mod: CPTII,S$GLB,, | Performed by: SPECIALIST/TECHNOLOGIST

## 2024-01-29 NOTE — PROGRESS NOTES
Subjective:      Patient ID: Alejandra Ellis is a 48 y.o. female.    Chief Complaint: Numbness and Swelling of the Right Hand      HPI  Alejandra Ellis is a right hand dominant 48 y.o. female presenting today for R Wrist Pain.  States she began 2 months ago. She states all her pain is localized at the radial styloid process. She reports she has not tried any alleviating factors besides NSAIDs. Patient denies any previous tramua or surgeries to the wrist or hand. She does note numbness and tingling into median nerve distribution of the right hand as well occasional in the left. She denies any nocturnal numbness and tingling.     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     Review of patient's allergies indicates:  No Known Allergies      Current Outpatient Medications   Medication Sig Dispense Refill    ergocalciferol (ERGOCALCIFEROL) 50,000 unit Cap take ONE CAPSULE every week  3    FLUCELVAX QUAD 7353-3632 60 mcg (15 mcg x 4)/0.5 mL Susp       omeprazole (PRILOSEC) 40 MG capsule Take 1 capsule (40 mg total) by mouth every morning. (Patient not taking: Reported on 1/29/2024) 90 capsule 3     No current facility-administered medications for this visit.       Past Medical History:   Diagnosis Date    Lumbar radiculopathy        Past Surgical History:   Procedure Laterality Date    EPIDURAL STEROID INJECTION Bilateral 3/11/2020    Procedure: Injection, Steroid, Epidural Transforaminal;  Surgeon: Reji Vazquez Jr., MD;  Location: [facility];  Service: Pain Management;  Laterality: Bilateral;  Bilateral S1 TF NIKOS    Arrive @ 1300; No ATC or DM;  Requested online 3/6       Review of Systems:  Constitutional: Negative for chills and fever.   Respiratory: Negative for cough and shortness of breath.    Gastrointestinal: Negative for nausea and vomiting.   Skin: Negative for rash.   Neurological: Negative for dizziness and headaches.   Psychiatric/Behavioral: Negative for depression.   MSK as in HPI       OBJECTIVE:  "    PHYSICAL EXAM:  Ht 5' 5" (1.651 m)   Wt 54.9 kg (121 lb 0.5 oz)   BMI 20.14 kg/m²     GEN:  NAD, well-developed, well-groomed.  NEURO: Awake, alert, and oriented. Normal attention and concentration.    PSYCH: Normal mood and affect. Behavior is normal.  HEENT: No cervical lymphadenopathy noted.  CARDIOVASCULAR: Radial pulses 2+ bilaterally. No LE edema noted.  PULMONARY: Breath sounds normal. No respiratory distress.  SKIN: Intact, no rashes.      MSK:   Hand/Wrist Musculoskeletal Exam    Inspection    Right      Erythema: none      Ecchymosis: none      Edema: none      Deformity: none      Hand - prior incision: none      Wrist - prior incision: none    Left      Erythema: none      Ecchymosis: none      Edema: none      Deformity: none      Hand - prior incision: none      Wrist - prior incision: none    Palpation    Right      Wrist tenderness to palpation: first dorsal compartment    Range of Motion    Right Hand      Right hand range of motion is normal.      Left Hand      Left hand range of motion is normal.       Right Wrist      Right wrist range of motion is normal.      Left Wrist      Left wrist range of motion is normal.      Neurovascular    Right       Radial pulse: normal      Capillary refill: <3 sec      Ulnar nerve sensory distribution: normal      Median nerve sensory distribution: normal      Superficial radial nerve sensory distribution: normal    Left       Radial pulse: normal      Capillary refill: <3 sec      Ulnar nerve sensory distribution: normal      Median nerve sensory distribution: normal      Superficial radial nerve sensory distribution: normal    Special Tests    Right      Phalen's: positive      Finkelstein's test: positive      Carpal compression test: positive      Tinel's - carpal tunnel: positive    Left      Phalen's: positive      Carpal compression test: positive      Tinel's - carpal tunnel: negative      RADIOGRAPHS:  1/29/24  R Wrist XR  FINDINGS:  No bone, " joint, or soft tissue abnormality is seen.     Impression:     Unremarkable examination.    Comments: I have personally reviewed the imaging and I agree with the above radiologist's report.    ASSESSMENT/PLAN:       ICD-10-CM ICD-9-CM   1. De Quervain's tenosynovitis, right  M65.4 727.04   2. Tenosynovitis, de Quervain  M65.4 727.04   3. Bilateral carpal tunnel syndrome  G56.03 354.0       Orders Placed This Encounter    Ambulatory referral/consult to Physical/Occupational Therapy    EMG W/ ULTRASOUND AND NERVE CONDUCTION TEST 2 Extremities     Orders Placed This Encounter   Procedures    Ambulatory referral/consult to Physical/Occupational Therapy    EMG W/ ULTRASOUND AND NERVE CONDUCTION TEST 2 Extremities        Plan:   Treatment options discussed with patient include injections, therapy, and bracing. Patient would like to proceed with an injection today. Will order therapy. Will also order an EMG to evaluate her carpal tunnel. Thumb spica brace provided, as well as carpal tunnel braces. She will f/u in 6 weeks for potential repeat injection.     The patient indicates understanding of these issues and agrees to the plan.    Onel Solomon PA-C, [facility]   [facility] [place], [place]    Disclaimer: This note has been generated using voice-recognition software. There may be typographical errors that have been missed during proof-reading.

## 2024-02-06 ENCOUNTER — OFFICE VISIT (OUTPATIENT)
Dept: OBSTETRICS AND GYNECOLOGY | Facility: CLINIC | Age: 49
End: 2024-02-06
Payer: COMMERCIAL

## 2024-02-06 VITALS — SYSTOLIC BLOOD PRESSURE: 120 MMHG | DIASTOLIC BLOOD PRESSURE: 80 MMHG | BODY MASS INDEX: 20.1 KG/M2 | WEIGHT: 120.81 LBS

## 2024-02-06 DIAGNOSIS — Z01.419 WELL WOMAN EXAM WITH ROUTINE GYNECOLOGICAL EXAM: Primary | ICD-10-CM

## 2024-02-06 PROCEDURE — 99396 PREV VISIT EST AGE 40-64: CPT | Mod: S$GLB,,, | Performed by: OBSTETRICS & GYNECOLOGY

## 2024-02-06 PROCEDURE — 3008F BODY MASS INDEX DOCD: CPT | Mod: CPTII,S$GLB,, | Performed by: OBSTETRICS & GYNECOLOGY

## 2024-02-06 PROCEDURE — 99999 PR PBB SHADOW E&M-EST. PATIENT-LVL II: CPT | Mod: PBBFAC,,, | Performed by: OBSTETRICS & GYNECOLOGY

## 2024-02-06 PROCEDURE — 3074F SYST BP LT 130 MM HG: CPT | Mod: CPTII,S$GLB,, | Performed by: OBSTETRICS & GYNECOLOGY

## 2024-02-06 PROCEDURE — 3079F DIAST BP 80-89 MM HG: CPT | Mod: CPTII,S$GLB,, | Performed by: OBSTETRICS & GYNECOLOGY

## 2024-02-06 PROCEDURE — 1159F MED LIST DOCD IN RCRD: CPT | Mod: CPTII,S$GLB,, | Performed by: OBSTETRICS & GYNECOLOGY

## 2024-02-06 NOTE — PROGRESS NOTES
SUBJECTIVE:   Alejandra Ellis is a 48 y.o. female   for annual well woman exam.    Contraception: none, h/o infertility - ovarian factor  LMP was approximately 1.5 years ago, denies vasomotor symptoms    Past Medical History:   Diagnosis Date    Lumbar radiculopathy      Past Surgical History:   Procedure Laterality Date    EPIDURAL STEROID INJECTION Bilateral 3/11/2020    Procedure: Injection, Steroid, Epidural Transforaminal;  Surgeon: Reji Vazquez Jr., MD;  Location: [facility];  Service: Pain Management;  Laterality: Bilateral;  Bilateral S1 TF NIKOS    Arrive @ 1300; No ATC or DM;  Requested online 3/6     Social History     Socioeconomic History    Marital status:    Tobacco Use    Smoking status: Never    Smokeless tobacco: Never   Substance and Sexual Activity    Alcohol use: No    Drug use: No    Sexual activity: Yes     Partners: Male     Birth control/protection: None     No family history on file.  OB History    Para Term  AB Living   1 0 0 0 1 0   SAB IAB Ectopic Multiple Live Births   1 0 0 0 0      # Outcome Date GA Lbr Patrick/2nd Weight Sex Delivery Anes PTL Lv   1 SAB               Obstetric Comments   Gynhx: regular every 2 months   Denies abnl pap, pap 2018 NEG/HPV NEG   Denies abnl MMG,          Current Outpatient Medications   Medication Sig Dispense Refill    ergocalciferol (ERGOCALCIFEROL) 50,000 unit Cap take ONE CAPSULE every week  3    FLUCELVAX QUAD [id number] 60 mcg (15 mcg x 4)/0.5 mL Susp       omeprazole (PRILOSEC) 40 MG capsule Take 1 capsule (40 mg total) by mouth every morning. (Patient not taking: Reported on 2024) 90 capsule 3     No current facility-administered medications for this visit.     Allergies: Patient has no known allergies.       ROS:  GENERAL: Denies weight gain or weight loss. Feeling well overall.   SKIN: Denies rash or lesions.   HEAD: Denies head injury or headache.   NODES: Denies enlarged lymph nodes.   CHEST: Denies  chest pain or shortness of breath.   CARDIOVASCULAR: Denies palpitations or left sided chest pain.   ABDOMEN: No abdominal pain, constipation, diarrhea, nausea, vomiting or rectal bleeding.   URINARY: No frequency, dysuria, hematuria, or burning on urination.  REPRODUCTIVE: Denies vaginal discharge, abnormal vaginal bleeding, lesions, pelvic pain  BREASTS: The patient performs breast self-examination and denies pain, lumps, or nipple discharge.   HEMATOLOGIC: No easy bruisability or excessive bleeding.  MUSCULOSKELETAL: Denies joint pain or swelling.   NEUROLOGIC: Denies syncope or weakness.   PSYCHIATRIC: Denies depression, anxiety or mood swings.      OBJECTIVE:   /80   Wt 54.8 kg (120 lb 13 oz)   BMI 20.10 kg/m²   The patient appears well, alert, oriented x 3, in no distress.  PSYCH:  Normal, full range of affect  NECK: negative, no thyromegaly, trachea midline  SKIN: normal, good color, good turgor and no acne, striae, hirsutism  BREAST EXAM: breasts appear normal, no suspicious masses, no skin or nipple changes or axillary nodes  ABDOMEN: soft, non-tender; bowel sounds normal; no masses,  no organomegaly and no hernias, masses, or hepatosplenomegaly  GENITALIA: normal external genitalia, no erythema, no discharge  BLADDER: soft  URETHRA: normal appearing urethra with no masses, tenderness or lesions and normal urethra, normal urethral meatus  VAGINA: Normal  CERVIX: no lesions or cervical motion tenderness  UTERUS: normal size, contour, position, consistency, mobility, non-tender  ADNEXA: no mass, fullness, tenderness      ASSESSMENT:   1. Health maintenance  -pap UTD  -screening MMG UTD  -counseled on exercise and healthy diet, weight loss  -bone health:  Discussed Vitamin D and Calcium supplementation, weight bearing exercises  2.  Discussed safety at home/school/work, healthy and balanced diet, sleep hygiene, as well as violence/weapons exposure.

## 2024-02-19 ENCOUNTER — CLINICAL SUPPORT (OUTPATIENT)
Dept: REHABILITATION | Facility: HOSPITAL | Age: 49
End: 2024-02-19
Payer: COMMERCIAL

## 2024-02-19 DIAGNOSIS — M65.4 DE QUERVAIN'S TENOSYNOVITIS, RIGHT: Primary | ICD-10-CM

## 2024-02-19 PROCEDURE — 97530 THERAPEUTIC ACTIVITIES: CPT | Mod: PN

## 2024-02-19 PROCEDURE — 97165 OT EVAL LOW COMPLEX 30 MIN: CPT | Mod: PN

## 2024-02-19 NOTE — PATIENT INSTRUCTIONS
-Wear wrist brace/thumb spica during day time with activity for 4-6 weeks, to be removed for gentle thumb and wrist ROM only  -Reduce amount of self-massage to affected area to decrease irritation

## 2024-02-19 NOTE — PLAN OF CARE
"  [facility]  Occupational Therapy Initial Evaluation     Name: Alejandra Ellis  Clinic Number: [id number]    Therapy Diagnosis:   Encounter Diagnosis   Name Primary?    De Quervain's tenosynovitis, right Yes     Physician: Deejay Solomon PA-C    Physician Orders: Eval and Treat  Medical Diagnosis:   Diagnosis Description   M65.4 (ICD-10-CM) - De Quervain's tenosynovitis, right   Date/Mechanism of Injury: Insidious onset of R dorsolateral wrist pain since approx. December 2023  Evaluation Date: 2/19/2024  Insurance Authorization Period Expiration: TBD  Plan of Care Certification Period: 2/19/24-3/19/24   Date of Return to MD: 3/12/24; EMG ordered for 3/20/24   Visit # / Visits authorized: 1/1  FOTO: Initial evaluation/37.5    Precautions:  Standard    Time In:10:45  Time Out: 11:30  Total Appointment Time (timed & untimed codes): 45 minutes    Subjective     Date of Onset: Approx. December 2023     History of Current Condition/Mechanism of Injury: Alejandra reports: "When I move my wrist around- I have some pain and swelling. It's been going on for about 3 months. No major injuries. I can't think of why. I woke up one morning- and it just started hurting.'     Falls: No    Involved Side: right   Dominant Side: Right    Mechanism of Injury: Insidious onset   Imaging: X-Rays 1/29/24  "EXAMINATION:  XR WRIST COMPLETE 3 VIEWS RIGHT     CLINICAL HISTORY:  Pain, unspecified     TECHNIQUE:  PA, lateral, and oblique views of the right wrist were performed.     COMPARISON:  None     FINDINGS:  No bone, joint, or soft tissue abnormality is seen.     Impression:     Unremarkable examination."       Prior Therapy: No    Pain:  Functional Pain Scale Rating 0-10:   4/10 on average  4/10 at best  6/10 at worst  Location: R dorsolateral wrist & shoulder   Description: radiating and variable   Aggravating Factors: using machinery at work (small files/drimmels)   Easing Factors: self massage     Occupation:  nail salon " "technician   Working presently: employed (4 days/week; 8 hours/daily)   Duties: trim nails, file nails, nut cuticles, massaging feet, operating equipment     Functional Limitations/Social History:    Previous functional status includes: Independent with all ADLs.     Current Functional Status   Home/Living environment: Pt resides with spouse; Pt reports that her and her spouse share household management; that she performs 40% of the work    - 1 story home, 0 steps to enter    - DME: N/A      Limitation of Functional Status as follows:   ADLs/IADLs:     - Feeding: (I)     - Bathing: (I)    - Dressing/Grooming: (I)    - Home Management: (I)    - Driving: (I)     Leisure: walking, watching TV    Patient's Goals for Therapy: Decrease pain     Past Medical History/Physical Systems Review:   Alejandra Ellis  has a past medical history of Lumbar radiculopathy.    Alejandra Ellis  has a past surgical history that includes Epidural steroid injection (Bilateral, 3/11/2020).    Alejandra has a current medication list which includes the following prescription(s): ergocalciferol, flucelvax quad 9519-1675, and omeprazole.    Review of patient's allergies indicates:  No Known Allergies     Objective     Observation/Appearance:  Skin intact, dry and mild edema present just proximal to wrist crease    Edema. Measured in centimeters.   2/19/2024 2/19/2024    Right  Left    Proximal Wrist Crease (1 cm proximal)  15 cm 14 cm      Hand/Wrist ROM. Measured in degrees.   2/19/2024 2/19/2024    Right  Left    Thumb: MP 0/50 0/50                IP 0/70 0/80       Rad ADD/ABD 55 55       Pal ADD/ABD 55 55          Opposition 0 cm from 5th volar MP  0 cm from 5th volar MP     Note: "Stretching" noted with Finkelstein's test on R side at dorsolateral wrist     Wrist Flex/Ext: 75/60   (L: 75/60)   Wrist RD/UD: 20/30     (L: 20/30)       Sensation  Median Nerve Distribution 2/19/2024 2/19/2024    Right  Left    Hungry Horse Patricia     Normal 1.65-2.83 X X "   Diminished Light Touch 3.22-3.61     Diminished Protective 3.84-4.31     Loss of Protective 4.56-6.65     Untestable >6.65       Note: Pt reports intermittent paresthesias through her R volar index and long finger     Special Tests:   Right   2/19/2024   Thumb CMC Grind Test Negative    Finkelstein's Test Mildly positive    Phalen's Test Negative   Tinel's Test a carpal tunnel  Negative         Strength (Dyanmometer) and Pinch Strength (Pinch Gauge)  Measured in pounds and psi. Average of three trials.   2/19/2024 2/19/2024    Right  Left    Rung II 54.2 52.2   Key Pinch 16 14   3pt Pinch 14 12         CMS Impairment/Limitation/Restriction for FOTO Wrist Survey    Therapist reviewed FOTO scores for Alejandra Ellis on 2/19/2024.   FOTO documents entered into [employer] - see Media section.    Limitation Score: 37.5%  Category: ADLs/IADLs         Treatment     Total Treatment time separate from Evaluation time:10 minutes     Alejandra received the treatments listed below:      Therapeutic activities to improve functional performance for 10 minutes, including:  -Education on rationale and types of wrist/thumb braces to address current conditions  -Eduction on activity/work modification to rest inflamed thumb/wrist extensors  -Education on use of modalities to reduce pain  -Gentle AROM for thumb and wrist (isolated) to maintain joint mobility     Patient Education and Home Exercises      Education provided:   -Role of OT, goals for OT, scheduling/cancellations, insurance limitations with patient.    Written Home Exercises Provided: yes.  Exercises were reviewed and Alejandra was able to demonstrate them prior to the end of the session.    Alejandra demonstrated good  understanding of the education provided.     Pt was advised to perform these exercises free of pain, and to stop performing them if pain occurs.    See EMR under Patient Instructions for exercises provided 2/19/2024.    Assessment     Alejandra Ellis is a 48 y.o. female referred to  outpatient occupational therapy and presents with a medical diagnosis of M65.4 (ICD-10-CM) - De Quervain's tenosynovitis, right.    Following medical record review it is determined that pt will benefit from occupational therapy services in order to maximize pain free and/or functional use of her R wrist/hand. The following goals were discussed with the patient and patient is in agreement with them as to be addressed in the treatment plan. The patient's rehab potential is Good.     Anticipated barriers to Occupational Therapy: None noted     Plan of care discussed with patient: Yes  Patient's spiritual, cultural and educational needs considered and patient is agreeable to the plan of care and goals as stated below:     Medical Necessity is demonstrated by the following  Occupational Profile/History  Co-morbidities and personal factors that may impact the plan of care [x] LOW: Brief chart review  [] MODERATE: Expanded chart review   [] HIGH: Extensive chart review    Moderate / High Support Documentation: N/A     Examination  Performance deficits relating to physical, cognitive or psychosocial skills that result in activity limitations and/or participation restrictions  [x] LOW: addressing 1-3 Performance deficits  [] MODERATE: 3-5 Performance deficits  [] HIGH: 5+ Performance deficits (please support below)    Moderate / High Support Documentation:    Physical:  Edema  Pain    Cognitive:  No Deficits    Psychosocial:    No Deficits     Treatment Options [x] LOW: Limited options  [] MODERATE: Several options  [] HIGH: Multiple options      Decision Making/ Complexity Score: low       The following goals were discussed with the patient and patient is in agreement with them as to be addressed in the treatment plan.     Long Term Goals (to be met in 4 weeks or by DC):   1. Patient to be IND and compliant with HEP & attendance for duration of therapy.  2. Patient will demonstrate increased R thumb IP flexion by 5-10  degrees to increase functional hand use for work-related tasks.   3. Patient will report increase in functional use of her R hand by 10% as evidenced by the FOTO outcome measure.   4. Patient will report no more than 1/10 pain in R wrist/hand.   5. Patient will demonstrate decreased wrist edema by 0.5 cm.     Plan   Certification Period/Plan of care expiration: 2/19/2024 to 3/19/24.    Outpatient Occupational Therapy 1 time weekly for 2-3 weeks to include the following interventions: Paraffin, Fluidotherapy, Manual therapy/joint mobilizations, US 3 mhz, Therapeutic exercises/activities., Joint Protection, and Energy Conservation.        Thank you for allowing me to assist in the care of your Patient. If you have any questions or concerns, please don't hesitate to e-mail or contact me directly.      INGA Arzate/L  [facility]   Phone: [phone]  Fax: [phone]

## 2024-03-05 ENCOUNTER — CLINICAL SUPPORT (OUTPATIENT)
Dept: REHABILITATION | Facility: HOSPITAL | Age: 49
End: 2024-03-05
Payer: COMMERCIAL

## 2024-03-05 DIAGNOSIS — M65.4 DE QUERVAIN'S TENOSYNOVITIS, RIGHT: Primary | ICD-10-CM

## 2024-03-05 PROCEDURE — 97140 MANUAL THERAPY 1/> REGIONS: CPT | Mod: PN

## 2024-03-05 PROCEDURE — 97530 THERAPEUTIC ACTIVITIES: CPT | Mod: PN

## 2024-03-05 NOTE — PROGRESS NOTES
"[facility]  Occupational Therapy Treatment Note     Date: 3/5/2024  Name: Alejandra Ellis  Clinic Number: [id number]    Therapy Diagnosis:   Encounter Diagnosis   Name Primary?    De Quervain's tenosynovitis, right Yes     Physician: Deejay Solomon PA-C    Physician Orders: Eval and Treat  Medical Diagnosis:   Diagnosis Description   M65.4 (ICD-10-CM) - De Quervain's tenosynovitis, right   Date/Mechanism of Injury: Insidious onset of R dorsolateral wrist pain since approx. December 2023  Evaluation Date: 2/19/2024  Insurance Authorization Period Expiration: 12/31/24  Plan of Care Certification Period: 2/19/24-3/19/24; 3 visits on POC  Progress Note Due: 3/19/24  Date of Return to MD: 3/12/24; EMG ordered for 3/20/24   Visit # / Visits authorized: 1/20  FOTO: Initial evaluation/37.5     Precautions:  Standard    Time In: 10:00  Time Out: 10:30  Total Billable Time: 30 minutes    Subjective     Pt reports: "It's the same as before. I haven't been wearing the brace when I'm working. It's hard to do that. I also have the tingling in both of my hands     She was compliant with home exercise program given last session.     Response to previous treatment:No changes reported     Functional change: None reported     Pain: 5/10  Location: right dorsolateral wrist       Objective     Objective Measures updated at progress report unless specified.    Treatment     Alejandra received the treatments listed below:      Manual therapy techniques: Soft tissue Mobilization were applied to the: R wrist/forearm for 10 minutes    Therapeutic activities to improve functional performance for 20 minutes, including:  -Education on activity modification and joint protection for management of DeQuervain's tenosynovitis and carpal tunnel syndrome     Patient Education and Home Exercises     Education provided:   - Importance of compliance w/ HEP to maximize gains   - Progress towards goals     Written Home Exercises Provided: No. "   Exercises were reviewed and Alejandra was able to demonstrate them prior to the end of the session.  Alejandra demonstrated good  understanding of the home program provided. See EMR under Patient Instructions for education provided on 2/19/24.      Assessment     Pt would continue to benefit from skilled OT. Pt presents today with no changes in her R dorsolateral wrist pain, noting non-compliance w/ home program recommendations provided at the time of her evaluation 2 weeks ago. Pt notes that wearing a low profile thumb spica brace hinders her work performance. I re-iterated that a period of rest and activity modification is necessary in the case of repetitive use and/or overuse injuries. Pt verbalized agreement with same. EMG scheduled for 3/20/24.      Alejandra is progressing well towards her goals and there are no updates to goals at this time. Pt prognosis is Good.     Pt will continue to benefit from skilled Outpatient Occupational Therapy to address the deficits listed in the problem list on initial evaluation provide Pt/family education and to maximize Pt's level of independence in the home and community environment.     Pt's spiritual, cultural and educational needs considered and pt agreeable to plan of care and goals.    Anticipated barriers to occupational therapy: None noted       Long Term Goals (to be met in 4 weeks or by DC):   1. Patient to be IND and compliant with HEP & attendance for duration of therapy.  2. Patient will demonstrate increased R thumb IP flexion by 5-10 degrees to increase functional hand use for work-related tasks.   3. Patient will report increase in functional use of her R hand by 10% as evidenced by the FOTO outcome measure.   4. Patient will report no more than 1/10 pain in R wrist/hand.   5. Patient will demonstrate decreased wrist edema by 0.5 cm.     Plan     Patient is to be treated by Occupational Therapy 1 time per week for 2-3 weeks, or 3 visits, during the certification period  from 2/19/24 to 3/19/24 to achieve the established goals.       Updates/Grading for next session: Re-Assessment       HANSA Arzate

## 2024-03-07 ENCOUNTER — TELEPHONE (OUTPATIENT)
Dept: ORTHOPEDICS | Facility: CLINIC | Age: 49
End: 2024-03-07
Payer: COMMERCIAL

## 2024-03-12 ENCOUNTER — OFFICE VISIT (OUTPATIENT)
Dept: ORTHOPEDICS | Facility: CLINIC | Age: 49
End: 2024-03-12
Payer: COMMERCIAL

## 2024-03-12 VITALS — HEIGHT: 65 IN | WEIGHT: 120 LBS | BODY MASS INDEX: 19.99 KG/M2

## 2024-03-12 DIAGNOSIS — G56.03 BILATERAL CARPAL TUNNEL SYNDROME: ICD-10-CM

## 2024-03-12 DIAGNOSIS — M65.4 DE QUERVAIN'S TENOSYNOVITIS, RIGHT: Primary | ICD-10-CM

## 2024-03-12 PROCEDURE — 20550 NJX 1 TENDON SHEATH/LIGAMENT: CPT | Mod: RT,S$GLB,, | Performed by: SPECIALIST/TECHNOLOGIST

## 2024-03-12 PROCEDURE — 3008F BODY MASS INDEX DOCD: CPT | Mod: CPTII,S$GLB,, | Performed by: SPECIALIST/TECHNOLOGIST

## 2024-03-12 PROCEDURE — 99999 PR PBB SHADOW E&M-EST. PATIENT-LVL II: CPT | Mod: PBBFAC,,, | Performed by: SPECIALIST/TECHNOLOGIST

## 2024-03-12 PROCEDURE — 99214 OFFICE O/P EST MOD 30 MIN: CPT | Mod: 25,S$GLB,, | Performed by: SPECIALIST/TECHNOLOGIST

## 2024-03-12 PROCEDURE — 1159F MED LIST DOCD IN RCRD: CPT | Mod: CPTII,S$GLB,, | Performed by: SPECIALIST/TECHNOLOGIST

## 2024-03-12 RX ORDER — DEXAMETHASONE SODIUM PHOSPHATE 4 MG/ML
4 INJECTION, SOLUTION INTRA-ARTICULAR; INTRALESIONAL; INTRAMUSCULAR; INTRAVENOUS; SOFT TISSUE
Status: SHIPPED | OUTPATIENT
Start: 2024-03-12

## 2024-03-12 RX ADMIN — DEXAMETHASONE SODIUM PHOSPHATE 4 MG: 4 INJECTION, SOLUTION INTRA-ARTICULAR; INTRALESIONAL; INTRAMUSCULAR; INTRAVENOUS; SOFT TISSUE at 09:03

## 2024-03-12 NOTE — PROGRESS NOTES
Subjective:      Patient ID: Alejandra Ellis is a 48 y.o. female.    Chief Complaint: Swelling and Pain of the Right Wrist    Interval HPI  3/12/24  Patient reports to clinic today status post 1st dorsal compartment injection.  She reports minimal relief.  She states she has been attending therapy as well as doing home exercise program.  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  She states she is continued numbness and tingling through the median nerve distribution.  She states she has a nerve study being performed on March 20th.    HPI  1/29/24  Alejandra Ellis is a right hand dominant 48 y.o. female presenting today for R Wrist Pain.  States she began 2 months ago. She states all her pain is localized at the radial styloid process. She reports she has not tried any alleviating factors besides NSAIDs. Patient denies any previous tramua or surgeries to the wrist or hand. She does note numbness and tingling into median nerve distribution of the right hand as well occasional in the left. She denies any nocturnal numbness and tingling.     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     Review of patient's allergies indicates:  No Known Allergies      Current Outpatient Medications   Medication Sig Dispense Refill    ergocalciferol (ERGOCALCIFEROL) 50,000 unit Cap take ONE CAPSULE every week  3    FLUCELVAX QUAD 8119-0011 60 mcg (15 mcg x 4)/0.5 mL Susp       omeprazole (PRILOSEC) 40 MG capsule Take 1 capsule (40 mg total) by mouth every morning. (Patient not taking: Reported on 1/29/2024) 90 capsule 3     No current facility-administered medications for this visit.       Past Medical History:   Diagnosis Date    Lumbar radiculopathy        Past Surgical History:   Procedure Laterality Date    EPIDURAL STEROID INJECTION Bilateral 3/11/2020    Procedure: Injection, Steroid, Epidural Transforaminal;  Surgeon: Reji Vazquez Jr., MD;  Location:  "[facility] ENDO;  Service: Pain Management;  Laterality: Bilateral;  Bilateral S1 TF NIKOS    Arrive @ 1300; No ATC or DM;  Requested online 3/6       Review of Systems:  Constitutional: Negative for chills and fever.   Respiratory: Negative for cough and shortness of breath.    Gastrointestinal: Negative for nausea and vomiting.   Skin: Negative for rash.   Neurological: Negative for dizziness and headaches.   Psychiatric/Behavioral: Negative for depression.   MSK as in HPI       OBJECTIVE:     PHYSICAL EXAM:  Ht 5' 5" (1.651 m)   Wt 54.4 kg (120 lb)   BMI 19.97 kg/m²     GEN:  NAD, well-developed, well-groomed.  NEURO: Awake, alert, and oriented. Normal attention and concentration.    PSYCH: Normal mood and affect. Behavior is normal.  HEENT: No cervical lymphadenopathy noted.  CARDIOVASCULAR: Radial pulses 2+ bilaterally. No LE edema noted.  PULMONARY: Breath sounds normal. No respiratory distress.  SKIN: Intact, no rashes.      MSK:   Hand/Wrist Musculoskeletal Exam    Inspection    Right      Erythema: none      Ecchymosis: none      Edema: none      Deformity: none      Hand - prior incision: none      Wrist - prior incision: none    Left      Erythema: none      Ecchymosis: none      Edema: none      Deformity: none      Hand - prior incision: none      Wrist - prior incision: none    Palpation    Right      Wrist tenderness to palpation: first dorsal compartment    Range of Motion    Right Hand      Right hand range of motion is normal.      Left Hand      Left hand range of motion is normal.       Right Wrist      Right wrist range of motion is normal.      Left Wrist      Left wrist range of motion is normal.      Neurovascular    Right       Radial pulse: normal      Capillary refill: <3 sec      Ulnar nerve sensory distribution: normal      Median nerve sensory distribution: normal      Superficial radial nerve sensory distribution: normal    Left       Radial pulse: normal      Capillary refill: <3 " sec      Ulnar nerve sensory distribution: normal      Median nerve sensory distribution: normal      Superficial radial nerve sensory distribution: normal    Special Tests    Right      Phalen's: positive      Finkelstein's test: positive      Carpal compression test: positive      Tinel's - carpal tunnel: positive    Special tests additional comments: Positive what test of the right wrist.      RADIOGRAPHS:  1/29/24  R Wrist XR  FINDINGS:  No bone, joint, or soft tissue abnormality is seen.     Impression:     Unremarkable examination.    Comments: I have personally reviewed the imaging and I agree with the above radiologist's report.    ASSESSMENT/PLAN:       ICD-10-CM ICD-9-CM   1. De Quervain's tenosynovitis, right  M65.4 727.04   2. Bilateral carpal tunnel syndrome  G56.03 354.0              No orders of the defined types were placed in this encounter.       Plan:   Treatment options discussed with patient include injections, therapy, and bracing. Patient would like to proceed with a 2nd injection today.   Continue with regular therapy.  EMG to evaluate her carpal tunnel is on March 20th.  She will follow up with Dr. Figueroa afterwards to discuss the results.     The patient indicates understanding of these issues and agrees to the plan.    Onel Solomon PA-C, [facility]  Hand [facility]   [facility] [place], [place]    Disclaimer: This note has been generated using voice-recognition software. There may be typographical errors that have been missed during proof-reading.

## 2024-03-12 NOTE — PROCEDURES
R 1st Dorsal Compartment 2nd Injection    Date/Time: 3/12/2024 9:30 AM    Performed by: Deejay Solomon PA-C  Authorized by: Deejay Solomon PA-C    Consent Done?:  Yes (Verbal)  Indications:  Pain  Timeout: prior to procedure the correct patient, procedure, and site was verified    Local anesthesia used?: Yes    Anesthesia:  Local infiltration  Local anesthetic:  Lidocaine 1% without epinephrine  Anesthetic total (ml):  1    Location:  Wrist  Site:  R first doral compartment  Ultrasonic guidance for needle placement?: Yes (Images saved in the epic medial portion.  Direct visualization of the needle tip was obtained.)    Needle size:  25 G  Approach:  Radial  Medications:  4 mg dexAMETHasone 4 mg/mL  Patient tolerance:  Patient tolerated the procedure well with no immediate complications

## 2024-03-20 ENCOUNTER — PROCEDURE VISIT (OUTPATIENT)
Dept: NEUROLOGY | Facility: CLINIC | Age: 49
End: 2024-03-20
Payer: COMMERCIAL

## 2024-03-20 DIAGNOSIS — G56.03 BILATERAL CARPAL TUNNEL SYNDROME: ICD-10-CM

## 2024-03-20 PROCEDURE — 95886 MUSC TEST DONE W/N TEST COMP: CPT | Mod: S$GLB,,, | Performed by: PHYSICAL MEDICINE & REHABILITATION

## 2024-03-20 PROCEDURE — 95911 NRV CNDJ TEST 9-10 STUDIES: CPT | Mod: S$GLB,,, | Performed by: PHYSICAL MEDICINE & REHABILITATION

## 2024-03-20 NOTE — PROCEDURES
Test Date:  3/20/2024    Patient: alejandra sun : 1975 Physician: Joe Ordoñez D.O.   ID#: [id number] SEX: Female Ref. Phys: Deejay Solomon PA-C     HPI: Alejandra Sun is a 48 y.o.female who presents for NCS/EMG to evaluate for CTS bilaterally.    PROCEDURE:  Note that  was used (ARYAN) for interpretation for all explanations and during the procedure.  Prior to the procedure, the procedure was discussed in detail with the patient.  All questions were answered, and verbal consent was obtained.  For nerve conduction studies, a combination of surface electrodes, bar electrodes, and/or ring electrodes were used as needed.  For needle EMG, each site was cleaned and prepped in usual fashion with an alcohol pad.  A monopolar needle (28G) was used.  There was no significant bleeding, and bandages were applied as needed.  The procedure was tolerated without adverse effect.  I discussed with the patient that the data would be reviewed and a report sent to the referring provider, where any follow up questions regarding next steps should be directed.        NCV & EMG Findings:  All nerve conduction studies (as indicated in the following tables) were within normal limits.  All examined muscles (as indicated in the following table) showed no evidence of electrical instability.      IMPRESSIONS:  This is a normal electrophysiologic study of the bilateral upper extremities.          ___________________________  Joe Ordoñez D.O.        NCS+  Motor Nerve Results      Latency Amplitude F-Lat Segment Distance CV Comment   Site (ms) Norm (mV) Norm (ms)  (cm) (m/s) Norm    Left Median (APB)   Wrist 4.0  < 4.4 6.5  > 4.2         Elbow 7.8 - 5.8 -  Elbow-Wrist 22 58  > 51    Right Median (APB)   Wrist 4.2  < 4.4 7.2  > 4.2         Elbow 8.0 - 6.7 -  Elbow-Wrist 20 53  > 51    Left Ulnar (ADM)   Wrist 3.5  < 3.7 11.1  > 3.0         Bel Elbow 6.3 - 11.3 -  Bel Elbow-Wrist 24 86  > 52    Abv Elbow 7.6 - 10.9 -  Abv  Elbow-Bel Elbow 9 69  > 43    Right Ulnar (ADM)   Wrist 2.5  < 3.7 12.8  > 3.0         Bel Elbow 6.4 - 12.1 -  Bel Elbow-Wrist 24.5 63  > 52    Abv Elbow 7.8 - 11.8 -  Abv Elbow-Bel Elbow 10 71  > 43      Sensory Nerve Results      Start Lat Latency (Peak) Amplitude (P-P) Segment Distance Start CV Comment   Site (ms) Norm (ms) (µV) Norm  (cm) (m/s) Norm    Left Median   Wrist-Dig I 2.3 - 3.0 48 - Wrist-Dig I 10 43 -    Wrist-Dig II 2.7  < 3.3 3.4 71  > 15 Wrist-Dig II 14 52  > 42    Palm-Wrist 1.65 - 2.2 114 - Palm-Wrist - - -    Right Median   Wrist-Dig I 2.5 - 3.3 27 - Wrist-Dig I 10 40 -    Wrist-Dig II 2.7  < 3.3 3.4 58  > 15 Wrist-Dig II 14 52  > 42    Palm-Wrist 1.63 - 2.1 87 - Palm-Wrist - - -    Left Ulnar (Rec:Wrist)   Palm 1.45 - 2.0 38 - Palm-Wrist - - -    Right Ulnar (Rec:Wrist)   Palm 1.28 - 1.83 40 - Palm-Wrist - - -    Left Radial (Rec:Dig I)   Wrist 2.2 - 2.8 8 - Wrist-Dig I 10 45 -    Right Radial (Rec:Dig I)   Wrist 2.1 - 3.0 7 - Wrist-Dig I 10 48 -      Inter-Nerve Comparisons     Nerve 1 Value 1 Nerve 2 Value 2 Parameter Result Normal   Sensory Sites   R Median Wrist-Dig I 3.3 ms R Radial Wrist-Dig I 3.0 ms Peak Lat Diff 0.30 ms <0.40   L Median Wrist-Dig I 3.0 ms L Radial Wrist-Dig I 2.8 ms Peak Lat Diff 0.20 ms <0.40   R Median Palm-Wrist 2.1 ms R Ulnar Palm-Wrist 1.83 ms Peak Lat Diff 0.27 ms <0.30   L Median Palm-Wrist 2.2 ms L Ulnar Palm-Wrist 2.0 ms Peak Lat Diff 0.20 ms <0.30     EMG+     Side Muscle Nerve Root Ins Act Fibs Psw Amp Dur Poly Recrt Int Pat Comment   Left Deltoid Axillary C5-C6 Nml Nml Nml Nml Nml 0 Nml Nml    Left Biceps Musculocut C5-C6 Nml Nml Nml Nml Nml 0 Nml Nml    Left Triceps Radial C6-C8 Nml Nml Nml Nml Nml 0 Nml Nml    Left Pronator Teres Median C6-C7 Nml Nml Nml Nml Nml 0 Nml Nml    Left FDI Ulnar C8-T1 Nml Nml Nml Nml Nml 0 Nml Nml    Right Deltoid Axillary C5-C6 Nml Nml Nml Nml Nml 0 Nml Nml    Right Biceps Musculocut C5-C6 Nml Nml Nml Nml Nml 0 Nml Nml    Right  Triceps Radial C6-C8 Nml Nml Nml Nml Nml 0 Nml Nml    Right Pronator Teres Median C6-C7 Nml Nml Nml Nml Nml 0 Nml Nml    Right FDI Ulnar C8-T1 Nml Nml Nml Nml Nml 0 Nml Nml            Waveforms:    Motor              Sensory

## 2024-03-26 ENCOUNTER — CLINICAL SUPPORT (OUTPATIENT)
Dept: REHABILITATION | Facility: HOSPITAL | Age: 49
End: 2024-03-26
Payer: COMMERCIAL

## 2024-03-26 ENCOUNTER — OFFICE VISIT (OUTPATIENT)
Dept: ORTHOPEDICS | Facility: CLINIC | Age: 49
End: 2024-03-26
Payer: COMMERCIAL

## 2024-03-26 VITALS — WEIGHT: 119.94 LBS | BODY MASS INDEX: 19.98 KG/M2 | HEIGHT: 65 IN

## 2024-03-26 DIAGNOSIS — M65.4 DE QUERVAIN'S TENOSYNOVITIS, RIGHT: Primary | ICD-10-CM

## 2024-03-26 DIAGNOSIS — G56.03 BILATERAL CARPAL TUNNEL SYNDROME: ICD-10-CM

## 2024-03-26 PROCEDURE — 97530 THERAPEUTIC ACTIVITIES: CPT | Mod: PN

## 2024-03-26 PROCEDURE — 99999 PR PBB SHADOW E&M-EST. PATIENT-LVL II: CPT | Mod: PBBFAC,,, | Performed by: ORTHOPAEDIC SURGERY

## 2024-03-26 PROCEDURE — 99214 OFFICE O/P EST MOD 30 MIN: CPT | Mod: 25,S$GLB,, | Performed by: ORTHOPAEDIC SURGERY

## 2024-03-26 PROCEDURE — 3008F BODY MASS INDEX DOCD: CPT | Mod: CPTII,S$GLB,, | Performed by: ORTHOPAEDIC SURGERY

## 2024-03-26 PROCEDURE — 1159F MED LIST DOCD IN RCRD: CPT | Mod: CPTII,S$GLB,, | Performed by: ORTHOPAEDIC SURGERY

## 2024-03-26 NOTE — PLAN OF CARE
"[facility]  Occupational Therapy Progress Note/Updated Plan of Care      Date: 3/26/2024  Name: Alejandra Ellis  Clinic Number: [id number]    Therapy Diagnosis:   Encounter Diagnosis   Name Primary?    De Quervain's tenosynovitis, right Yes     Physician: Deejay Solomon PA-C    Physician Orders: Eval and Treat  Date/Mechanism of Injury: Insidious onset of R dorsolateral wrist pain since approx. December 2023  Evaluation Date: 2/19/2024  Insurance Authorization Period Expiration: 12/31/24  Plan of Care Certification Period: 2/19/24-3/19/24; Request to extend POC through 5/26/24 (additional 4 visits)   Progress Note Due: 3/19/24  Date of Return to MD: 3/26/24; EMG ordered for 3/20/24   Visit # / Visits authorized: 2/20  FOTO: Initial evaluation/37.5; 3rd visit/emailed on 3/26/24     Precautions:  Standard    Time In: 08:32  Time Out: 09:15  Total Billable Time: 43 minutes    Subjective     Pt reports: "I'm having a little less pain in the past 2 weeks. I still don't wear the braces at work. Are there any options that are less bulky?"     She was not compliant with home exercise program given last session.     Response to previous treatment: N/A    Functional change: None reported     Pain: 4/10   Location: right dorsolateral wrist       Objective     Objective Measures updated at progress report unless specified.    Edema. Measured in centimeters.    2/19/2024 2/19/2024 3/26/2024     Right  Left  Right    Proximal Wrist Crease (1 cm proximal)  15 cm 14 cm  14.7 cm      Hand/Wrist ROM. Measured in degrees.    2/19/2024 2/19/2024 3/26/2024     Right  Left  Right   Thumb: MP 0/50 0/50 0/50                IP 0/70 0/80 0/80       Rad ADD/ABD 55 55 55       Pal ADD/ABD 55 55 55          Opposition 0 cm from 5th volar MP  0 cm from 5th volar MP 0 cm from 5th volar MP      Note: "Stretching" noted with Finkelstein's test on R side at dorsolateral wrist      Wrist Flex/Ext: 75/60   (L: 75/60)   Wrist RD/UD: " 20/30     (L: 20/30)      Special Tests:   Right    2/19/2024 3/26/2024   Thumb CMC Grind Test Negative  Negative   Finkelstein's Test Mildly positive  Mildly Positive   Phalen's Test Negative Negative   Tinel's Test a carpal tunnel  Negative  Negative          Strength (Dyanmometer) and Pinch Strength (Pinch Gauge)  Measured in pounds and psi. Average of three trials.    2/19/2024 2/19/2024 3/26/2024     Right  Left  Right   Rung II 54.2 52.2 54.7   Smiley Pinch 16 14 15   3pt Pinch 14 12 14            CMS Impairment/Limitation/Restriction for FOTO Wrist Survey     Therapist reviewed FOTO scores for Alejandra Ellis on 3/26/2024.   FOTO documents entered into [employer] - see Media section.     Limitation Score: emailed on 3/26/24  Category: ADLs/IADLs          Treatment     Alejandra received the treatments listed below:      Re-Assessment performed with measurements and report taken, goals updated x 25 minutes      Therapeutic activities to improve functional performance for 18 minutes, including:  -Education on low profile bracing options for DeQuervain's tenosynovitis  -Provided written HEP: gentle PROM for isolated thumb MP and IP flexion, median nerve glides, and tendon glides  -Education on cold modality use following use of R hand during work-related tasks: 1-3 x daily, 10 min each application     Patient Education and Home Exercises     Education provided:   - Importance of compliance w/ HEP to maximize gains   - Progress towards goals     Written Home Exercises Provided: Yes.    Exercises were reviewed and Alejandra was able to demonstrate them prior to the end of the session.  Alejandra demonstrated good  understanding of the home program provided. See EMR under Patient Instructions for education provided on 3/26/24.      Assessment     Pt would continue to benefit from skilled OT. Pt is making slow progress towards established goals, noting difficulty complying with bracing instructions during work-related tasks due to limited  use of her hand. Pt does report mild decrease in dorsolateral wrist pain following her recent dexamethasone injection on 3/12/14. Pt's recent EMG on 3/20/24 noted as normal     Alejandra is progressing well towards her goals and there are no updates to goals at this time. Pt prognosis is Good.     Pt will continue to benefit from skilled Outpatient Occupational Therapy to address the deficits listed in the problem list on initial evaluation provide Pt/family education and to maximize Pt's level of independence in the home and community environment.     Pt's spiritual, cultural and educational needs considered and pt agreeable to plan of care and goals.      Anticipated barriers to occupational therapy: None noted     Long Term Goals (to be met in 4 weeks or by DC):   1. Patient to be IND and compliant with HEP & attendance for duration of therapy. In progress 3/26  2. Patient will demonstrate increased R thumb IP flexion by 5-10 degrees to increase functional hand use for work-related tasks. Goal Met 3/26  3. Patient will report increase in functional use of her R hand by 10% as evidenced by the FOTO outcome measure. In progress 3/26  4. Patient will report no more than 1/10 pain in R wrist/hand. In progress 3/26  5. Patient will demonstrate decreased wrist edema by 0.5 cm. In progress 3/26    Plan     *Request to extend current Plan of Care through 5/26/24 to address the above stated goals for 1 x week for 4 weeks.       Updates/Grading for next session: TBD pending progress       HANSA Arzate

## 2024-03-26 NOTE — PROGRESS NOTES
Subjective:      Patient ID: Alejandra Ellis is a 48 y.o. female.    Chief Complaint: Pain of the Right Hand and Pain of the Left Hand    Interval history 03/26/2024:  Patient returns today for follow up of right de Quervain tenosynovitis and bilateral carpal tunnel syndrome.  Last visit underwent right 1st dorsal compartment CSI and referred for EMG.  EMG was obtained and shows no peripheral nerve compression bilaterally.  Endorses that she was obtained near 100% improvement in her R radial sided wrist pain after her CSI, continuing therapy, and thumb spica bracing.  Denies any change to her continued numbness that occurs primarily in the morning for around 5 minutes and tingling through the median nerve distributions bilaterally.  Denies any volar sided wrist pain or isolated weakness.    An  was used during the entirety of this visit.    Interval HPI  3/12/24  Patient reports to clinic today status post 1st dorsal compartment injection.  She reports minimal relief.  She states she has been attending therapy as well as doing home exercise program.  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  She states she is continued numbness and tingling through the median nerve distribution.  She states she has a nerve study being performed on March 20th.    HPI  1/29/24  Alejandra Ellis is a right hand dominant 48 y.o. female presenting today for R Wrist Pain.  States she began 2 months ago. She states all her pain is localized at the radial styloid process. She reports she has not tried any alleviating factors besides NSAIDs. Patient denies any previous tramua or surgeries to the wrist or hand. She does note numbness and tingling into median nerve distribution of the right hand as well occasional in the left. She denies any nocturnal numbness and tingling.     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     Review of patient's allergies  "indicates:  No Known Allergies      Current Outpatient Medications   Medication Sig Dispense Refill    ergocalciferol (ERGOCALCIFEROL) 50,000 unit Cap take ONE CAPSULE every week  3    FLUCELVAX QUAD 0745-5334 60 mcg (15 mcg x 4)/0.5 mL Susp       omeprazole (PRILOSEC) 40 MG capsule Take 1 capsule (40 mg total) by mouth every morning. (Patient not taking: Reported on 1/29/2024) 90 capsule 3     No current facility-administered medications for this visit.     Facility-Administered Medications Ordered in Other Visits   Medication Dose Route Frequency Provider Last Rate Last Admin    dexAMETHasone injection 4 mg  4 mg Other  Deejay Solomon PA-C   4 mg at 03/12/24 0930       Past Medical History:   Diagnosis Date    Lumbar radiculopathy        Past Surgical History:   Procedure Laterality Date    EPIDURAL STEROID INJECTION Bilateral 3/11/2020    Procedure: Injection, Steroid, Epidural Transforaminal;  Surgeon: Reji Vazquez Jr., MD;  Location: [facility];  Service: Pain Management;  Laterality: Bilateral;  Bilateral S1 TF NIKOS    Arrive @ 1300; No ATC or DM;  Requested online 3/6       Review of Systems:  Constitutional: Negative for chills and fever.   Respiratory: Negative for cough and shortness of breath.    Gastrointestinal: Negative for nausea and vomiting.   Skin: Negative for rash.   Neurological: Negative for dizziness and headaches.   Psychiatric/Behavioral: Negative for depression.   MSK as in HPI       OBJECTIVE:     PHYSICAL EXAM:  Ht 5' 5" (1.651 m)   Wt 54.4 kg (119 lb 14.9 oz)   BMI 19.96 kg/m²     GEN:  NAD, well-developed, well-groomed.  NEURO: Awake, alert, and oriented. Normal attention and concentration.    PSYCH: Normal mood and affect. Behavior is normal.  HEENT: No cervical lymphadenopathy noted.  CARDIOVASCULAR: Radial pulses 2+ bilaterally. No LE edema noted.  PULMONARY: Breath sounds normal. No respiratory distress.  SKIN: Intact, no rashes.      MSK:   Hand/Wrist " Musculoskeletal Exam    Inspection    Right      Erythema: none      Ecchymosis: none      Edema: none      Deformity: none      Hand - prior incision: none      Wrist - prior incision: none    Left      Erythema: none      Ecchymosis: none      Edema: none      Deformity: none      Hand - prior incision: none      Wrist - prior incision: none    Palpation    Right      Wrist tenderness to palpation: first dorsal compartment    Range of Motion    Right Hand      Right hand range of motion is normal.      Left Hand      Left hand range of motion is normal.       Right Wrist      Right wrist range of motion is normal.      Left Wrist      Left wrist range of motion is normal.      Neurovascular    Right       Radial pulse: normal      Capillary refill: <3 sec      Ulnar nerve sensory distribution: normal      Median nerve sensory distribution: normal      Superficial radial nerve sensory distribution: normal    Left       Radial pulse: normal      Capillary refill: <3 sec      Ulnar nerve sensory distribution: normal      Median nerve sensory distribution: normal      Superficial radial nerve sensory distribution: normal    Special Tests    Right      Phalen's: positive      Finkelstein's test: positive      Carpal compression test: negative      Tinel's - carpal tunnel: negative    Left      Tinel's - carpal tunnel: negative    Special tests additional comments: Positive what test of the right wrist.      RADIOGRAPHS:  1/29/24  R Wrist XR  FINDINGS:  No bone, joint, or soft tissue abnormality is seen.     Impression:     Unremarkable examination.    Comments: I have personally reviewed the imaging and I agree with the above radiologist's report.    EMG obtained on 03/20/2024 was reviewed by me and shows no peripheral nerve compression throughout bilateral upper extremities.    ASSESSMENT/PLAN:     48F who presents with right de Quervain tenosynovitis and concern for clinical bilateral carpal tunnel syndrome, despite  negative EMG.     Plan:   Treatment options discussed with patient include injections, therapy, and bracing.  Given that she has a negative EMG, recommended proceeding with bilateral carpal tunnel CSIs to help improve her symptoms as well as possibly provide diagnostic evaluation to determine if carpal tunnel release would be beneficial to her.  - bilateral carpal tunnel CSI performed in clinic today  - continue thumb spica bracing for de Quervain and PT  - follow up in 6 weeks for repeat evaluation

## 2024-03-26 NOTE — PROGRESS NOTES
Has de Quervain and bilateral numbness tingling in her hand EMG nerve conduction study was normal however she does have numbness and tingling in the median nerve distribution.  She states she wakes up in the morning with it she has a  on examination really was not significantly positive for provocative examination for carpal tunnel plan today we will do steroid injections bilateral carpal tunnel she states her de Quervain is much better since she had her injection

## 2024-04-16 ENCOUNTER — CLINICAL SUPPORT (OUTPATIENT)
Dept: REHABILITATION | Facility: HOSPITAL | Age: 49
End: 2024-04-16
Payer: COMMERCIAL

## 2024-04-16 DIAGNOSIS — M65.4 DE QUERVAIN'S TENOSYNOVITIS, RIGHT: Primary | ICD-10-CM

## 2024-04-16 NOTE — PROGRESS NOTES
"[facility]  Occupational Therapy Progress Note/Discharge Summary     Date: 4/16/2024  Name: Alejandra Ellis  Clinic Number: [id number]    Therapy Diagnosis:   Encounter Diagnosis   Name Primary?    De Quervain's tenosynovitis, right Yes     Physician: Deejay Solomon PA-C    Physician Orders: Eval and Treat  Medical Diagnosis:   Diagnosis Description   M65.4 (ICD-10-CM) - De Quervain's tenosynovitis, right   Date/Mechanism of Injury: Insidious onset of R dorsolateral wrist pain since approx. December 2023  Evaluation Date: 2/19/2024  Insurance Authorization Period Expiration: 12/31/24  Plan of Care Certification Period: 2/19/24-5/26/24  Progress Note Due: 4/26/24  Date of Return to MD: 3/12/24; EMG ordered for 3/20/24   Visit # / Visits authorized: 3/20  FOTO: Initial evaluation/37.5; 3rd visit/25.8; 4th visit/6.7%     Precautions:  Standard    Time In: 09:20  Time Out: 09:55  Total Billable Time: 35 minutes    Subjective     Pt reports: "I'm doing a little better. I'd like to get an Xray of my neck to see if the tingling in my hands is coming from there."    She was mostly compliant with home exercise program given last session.     Response to previous treatment: favorable    Functional change: Decreased wrist pain     Pain: 1/10-3/10  Location: right dorsolateral wrist       Objective     Objective Measures updated at progress report unless specified.    Edema. Measured in centimeters.    2/19/2024 2/19/2024 3/26/2024 4/16/2024     Right  Left  Right  Right   Proximal Wrist Crease (1 cm proximal)  15 cm 14 cm  14.7 cm 14.7 cm      Hand/Wrist ROM. Measured in degrees.    2/19/2024 2/19/2024 3/26/2024 4/16/2024     Right  Left  Right Right    Thumb: MP 0/50 0/50 0/50 0/50                IP 0/70 0/80 0/80 0/80       Rad ADD/ABD 55 55 55 55       Pal ADD/ABD 55 55 55 55          Opposition 0 cm from 5th volar MP  0 cm from 5th volar MP 0 cm from 5th volar MP 0 cm from 5th volar MP      Note: " ""Stretching" noted with Finkelstein's test on R side at dorsolateral wrist      Wrist Flex/Ext: 75/60   (L: 75/60)   Wrist RD/UD: 20/30     (L: 20/30)        Strength (Dyanmometer) and Pinch Strength (Pinch Gauge)  Measured in pounds and psi. Average of three trials.    2/19/2024 2/19/2024 3/26/2024 4/16/2024     Right  Left  Right Right    Rung II 54.2 52.2 54.7 54.2   Key Pinch 16 14 15 15   3pt Pinch 14 12 14 10            CMS Impairment/Limitation/Restriction for FOTO Wrist Survey     Therapist reviewed FOTO scores for Alejandra Ellis on 4/16/2024.   FOTO documents entered into [employer] - see Media section.     Limitation Score: 6.7%  Category: ADLs/IADLs           Treatment     Alejandra received the treatments listed below:      Re-Assessment performed with measurements and report taken, goals updated x 20 minutes      Therapeutic activities to improve functional performance for 15 minutes, including:  -Review of education on activity modification and joint protection for management of DeQuervain's tenosynovitis and carpal tunnel syndrome     Patient Education and Home Exercises     Education provided:   - Importance of compliance w/ HEP to maximize gains   - Progress towards goals     Written Home Exercises Provided: Continue with previous HEP.   Exercises were reviewed and Alejandra was able to demonstrate them prior to the end of the session.  Alejandra demonstrated good  understanding of the home program provided. See EMR under Patient Instructions for education provided previous sessions.      Assessment     Pt is making steady progress towards established goals, noting normative R wrist and thumb ROM, composite  & functional pinch strengths, and decreased dorsolateral thumb and wrist pain reported since her initial evaluation. Pt notes minimal functional limitations in regards to ADLs and IADLs due to same, but does report frustrating paresthesias in her B hands upon waking each day.      Pt's spiritual, cultural and " educational needs considered and pt agreeable to plan of care and goals.    Long Term Goals (to be met in 4 weeks or by DC):   1. Patient to be IND and compliant with HEP & attendance for duration of therapy. Goal Met 4/16  2. Patient will demonstrate increased R thumb IP flexion by 5-10 degrees to increase functional hand use for work-related tasks. Goal Met 3/26  3. Patient will report increase in functional use of her R hand by 10% as evidenced by the FOTO outcome measure. In progress 3/26  4. Patient will report no more than 1/10 pain in R wrist/hand. Goal Met 4/16  5. Patient will demonstrate decreased wrist edema by 0.5 cm. In progress 4/16    Plan     Patient is appropriate for discharge from Outpatient Occupational Therapy at this time and will continue at home with HEP.  Follow up with PCP for any further medication, imaging questions, and/or diagnostic testing concerns.     HANSA Arzate

## 2024-05-15 ENCOUNTER — CLINICAL SUPPORT (OUTPATIENT)
Dept: ENDOSCOPY | Facility: HOSPITAL | Age: 49
End: 2024-05-15
Attending: INTERNAL MEDICINE
Payer: COMMERCIAL

## 2024-05-15 ENCOUNTER — TELEPHONE (OUTPATIENT)
Dept: ENDOSCOPY | Facility: HOSPITAL | Age: 49
End: 2024-05-15

## 2024-05-15 DIAGNOSIS — K21.9 CHRONIC GERD: Primary | ICD-10-CM

## 2024-05-15 DIAGNOSIS — K21.9 CHRONIC GERD: ICD-10-CM

## 2024-05-15 NOTE — TELEPHONE ENCOUNTER
Call completed with assistance of [place] translators ID#s [id number] and [id number].     Spoke to pt to schedule procedure(s) Upper Endoscopy (EGD)       Physician to perform procedure(s) Dr. ANALIA Baptiste  Date of Procedure (s) 6/18/24  Arrival Time 12:30 PM  Time of Procedure(s) 1:30 PM   Location of Procedure(s) [place] 2nd Floor  Type of Rx Prep sent to patient: N/A  Instructions provided to patient via [facility]    Patient was informed on the following information and verbalized understanding. Screening questionnaire reviewed with patient and complete. If procedure requires anesthesia, a responsible adult needs to be present to accompany the patient home, patient cannot drive after receiving anesthesia. Appointment details are tentative, especially check-in time. Patient will receive a prep-op call 7 days prior to confirm check-in time for procedure. If applicable the patient should contact their pharmacy to verify Rx for procedure prep is ready for pick-up. Patient was advised to call the scheduling department at [phone] if pharmacy states no Rx is available. Patient was advised to call the endoscopy scheduling department if any questions or concerns arise.       Endoscopy Scheduling Department

## 2024-06-11 ENCOUNTER — TELEPHONE (OUTPATIENT)
Dept: ENDOSCOPY | Facility: HOSPITAL | Age: 49
End: 2024-06-11
Payer: COMMERCIAL

## 2024-06-11 NOTE — TELEPHONE ENCOUNTER
Contacted Pt via Language Line  [phone] for endoscopy pre-call for upcoming procedure.  Pre-call complete, Pt does not have any further questions. Arrival time: 12:30PM  Instructions to email.      Upper Endoscopy (EGD) Prep Instructions     Date of procedure: 6/18/24 Arrive at: 12:30 PM     Location of Department:   [facility] [address] [place] [address]   Take the Elevators to 2nd Floor Endoscopy Procedural Area     How to prep:        Day Before Procedure 6/17/24      You may have a light evening meal.   No solid food after 7:00 pm.   Continue drinking clear liquids.        Day of the Procedure 6/18/24      You may have water/clear liquids until 4 hours before your procedure or as directed by the scheduling nurse 9:30 AM.   See below for list.     What You CANNOT do:   Do not drink milk or anything colored red.  Do not drink alcohol.  No gum chewing or candy morning of procedure     Liquids That Are OK to Drink:   Water  Sports drinks (Gatorade, Power-Aid)  Coffee or tea (no cream or nondairy creamer)  Clear juices without pulp (apple, white grape)  Gelatin desserts (no fruit or toppings)  Clear soda (sprite, coke, ginger ale)  Chicken broth (until 12 midnight the night before procedure)           Comments:     IMPORTANT INFORMATION TO KNOW BEFORE YOUR PROCEDURE     [facility] 2nd Floor     If your procedure requires the administration of anesthesia, it is necessary for a responsible adult to drive you home. (Medical Transportation, Uber, Lyft, Taxi, etc. may ONLY be used if a responsible adult is present to accompany you home.  The responsible adult CAN'T be the  of the service).       person must be available to return to pick you up within 15 minutes of being notified of discharge.         Please bring a picture ID, insurance card, & copayment        Take Medications as directed below:     If you begin taking any blood thinning medications or  injectable weight loss/diabetes medications (other than insulin) , please contact the endoscopy scheduling department listed below as soon as possible.     If you are diabetic see the attached instruction sheet regarding your medication.      If you take HEART, BLOOD PRESSURE, SEIZURE, PAIN, LUNG (including inhalers/nebulizers), ANTI-REJECTION (transplant patients), or PSYCHIATRIC medications, please take at your regular times with a sip of water or as directed by the scheduling nurse.      Important contact information:     Endoscopy [phone] Hours of operation Monday-Friday 8:00-4:30pm.     Questions about insurance or financial obligations call [phone] or [phone].     If you have questions regarding the prep or need to reschedule, please call [phone]. After hours questions requiring immediate assistance, contact [facility] nurse line at [phone] or [phone].   NOTE:      On occasion, unforeseen circumstances may cause a delay in your procedure start time. We respect your time and appreciate your patience during these circumstances.

## 2024-06-13 ENCOUNTER — PATIENT MESSAGE (OUTPATIENT)
Dept: INTERNAL MEDICINE | Facility: CLINIC | Age: 49
End: 2024-06-13
Payer: COMMERCIAL

## 2024-06-18 ENCOUNTER — TELEPHONE (OUTPATIENT)
Dept: ENDOSCOPY | Facility: HOSPITAL | Age: 49
End: 2024-06-18
Payer: COMMERCIAL

## 2024-06-18 NOTE — TELEPHONE ENCOUNTER
----- Message from Tabitha Cortez sent at 6/18/2024 11:33 AM CDT -----    ----- Message -----  From: Frannie Alicia MA  Sent: 6/18/2024  10:36 AM CDT  To: [facility] Endoscopy Schedulers      ----- Message -----  From: Kathy Cardozo  Sent: 6/18/2024  10:36 AM CDT  To: [facility] Staff    Type:  Endoscopy    Who Called:Pt   Does the patient know what this is regarding?: would like to cancel endoscopy procedure today due to emergency   Would the patient rather a call back or a response via [facility]? Call  Best Call Back Number: [phone]  Additional Information:

## 2024-06-18 NOTE — TELEPHONE ENCOUNTER
Spoke to pt to reschedule procedure(s) Upper Endoscopy (EGD)       Physician to perform procedure(s) Dr. ANALIA Baptiste  Date of Procedure (s) 8/13/2024  Arrival Time 12:00 PM  Time of Procedure(s) 1:00 PM   Location of Procedure(s) [place] 2nd Floor  Type of Rx Prep sent to patient: N/A  Instructions provided to patient via Email    Patient was informed on the following information and verbalized understanding. Screening questionnaire reviewed with patient and complete. If procedure requires anesthesia, a responsible adult needs to be present to accompany the patient home, patient cannot drive after receiving anesthesia. Appointment details are tentative, especially check-in time. Patient will receive a prep-op call 7 days prior to confirm check-in time for procedure. If applicable the patient should contact their pharmacy to verify Rx for procedure prep is ready for pick-up. Patient was advised to call the scheduling department at [phone] if pharmacy states no Rx is available. Patient was advised to call the endoscopy scheduling department if any questions or concerns arise.       Endoscopy Scheduling Department           EGD Procedure Prep Instructions      Date of procedure: 8/13/2024 Arrive at: 12:00PM      Location of Department: [facility] [address], [place] [address]   Take the Elevators to 2nd Floor Endoscopy Procedural Area    How to prep:    Day Before Procedure: 8/12/2024     You may have a light evening meal.   No solid food after 7:00 pm.   Continue drinking clear liquids.       Day of the Procedure:  8/13/2024     You may have water/clear liquids until 4 hours before your procedure or as directed by the scheduling nurse  9:00AM. See below for list.    What You CANNOT do:   Do not drink milk or anything colored red.  Do not drink alcohol.  No gum chewing or candy morning of procedure.    Liquids That Are OK to Drink:   Water  Sports drinks (Gatorade, Power-Aid)  Coffee or tea (no cream or nondairy  creamer)  Clear juices without pulp (apple, white grape)  Gelatin desserts (no fruit or toppings)  Clear soda (sprite, coke, ginger ale)  Chicken broth (until 12 midnight the night before procedure)      Comments:          IMPORTANT INFORMATION TO KNOW BEFORE YOUR PROCEDURE    [facility] 2nd Floor    If your procedure requires the administration of anesthesia, it is necessary for a responsible adult to drive you home. (Medical Transportation, Uber, Lyft, Taxi, etc. may ONLY be used if a responsible adult is present to accompany you home.  The responsible adult CAN'T be the  of the service).      person must be available to return to pick you up within 15 minutes of being notified of discharge.       Please bring a picture ID, insurance card, & copayment      Take Medications as directed below:      If you begin taking any blood thinning medications or injectable weight loss/diabetes medications (other than insulin) , please contact the endoscopy scheduling department listed below as soon as possible.    If you are diabetic see the attached instruction sheet regarding your medication.     If you take HEART, BLOOD PRESSURE, SEIZURE, PAIN, LUNG (including inhalers/nebulizers), ANTI-REJECTION (transplant patients), or PSYCHIATRIC medications, please take at your regular times with a sip of water or as directed by the scheduling nurse.     Important contact information:    Endoscopy [phone] Hours of operation Monday-Friday 8:00-4:30pm.    Questions about insurance or financial obligations call [phone] or [phone].    If you have questions regarding the prep or need to reschedule, please call [phone]. After hours questions requiring immediate assistance, contact [facility] nurse line at [phone] or [phone].   NOTE:     On occasion, unforeseen circumstances may cause a delay in your procedure start time. We respect your time and  appreciate your patience during these circumstances.      Comments:

## 2024-08-02 ENCOUNTER — PATIENT OUTREACH (OUTPATIENT)
Dept: ADMINISTRATIVE | Facility: HOSPITAL | Age: 49
End: 2024-08-02
Payer: COMMERCIAL

## 2024-08-02 NOTE — PROGRESS NOTES
Health Maintenance Due   Topic Date Due    Shingles Vaccine (1 of 2) Never done    COVID-19 Vaccine (3 - 2023-24 season) 09/01/2023    Health Maintenance reviewed, updated and links triggered. Annual Exam due, portal message sent for   scheduling. ([facility]) 8/2/24

## 2024-08-06 ENCOUNTER — TELEPHONE (OUTPATIENT)
Dept: ENDOSCOPY | Facility: HOSPITAL | Age: 49
End: 2024-08-06
Payer: COMMERCIAL

## 2024-08-13 ENCOUNTER — HOSPITAL ENCOUNTER (OUTPATIENT)
Facility: HOSPITAL | Age: 49
Discharge: HOME | End: 2024-08-13
Attending: INTERNAL MEDICINE | Admitting: INTERNAL MEDICINE
Payer: COMMERCIAL

## 2024-08-13 ENCOUNTER — ANESTHESIA EVENT (OUTPATIENT)
Dept: ENDOSCOPY | Facility: HOSPITAL | Age: 49
End: 2024-08-13
Payer: COMMERCIAL

## 2024-08-13 ENCOUNTER — ANESTHESIA (OUTPATIENT)
Dept: ENDOSCOPY | Facility: HOSPITAL | Age: 49
End: 2024-08-13
Payer: COMMERCIAL

## 2024-08-13 VITALS
OXYGEN SATURATION: 100 % | HEART RATE: 82 BPM | SYSTOLIC BLOOD PRESSURE: 117 MMHG | HEIGHT: 65 IN | TEMPERATURE: 98 F | DIASTOLIC BLOOD PRESSURE: 73 MMHG | WEIGHT: 120 LBS | BODY MASS INDEX: 19.99 KG/M2 | RESPIRATION RATE: 18 BRPM

## 2024-08-13 DIAGNOSIS — K21.9 ACID REFLUX: ICD-10-CM

## 2024-08-13 PROCEDURE — 43239 EGD BIOPSY SINGLE/MULTIPLE: CPT | Performed by: INTERNAL MEDICINE

## 2024-08-13 PROCEDURE — 37000009 HC ANESTHESIA EA ADD 15 MINS: Performed by: INTERNAL MEDICINE

## 2024-08-13 PROCEDURE — 37000008 HC ANESTHESIA 1ST 15 MINUTES: Performed by: INTERNAL MEDICINE

## 2024-08-13 PROCEDURE — 88305 TISSUE EXAM BY PATHOLOGIST: CPT | Mod: 26,,, | Performed by: PATHOLOGY

## 2024-08-13 PROCEDURE — 27201012 HC FORCEPS, HOT/COLD, DISP: Performed by: INTERNAL MEDICINE

## 2024-08-13 PROCEDURE — 25000003 PHARM REV CODE 250: Performed by: NURSE ANESTHETIST, CERTIFIED REGISTERED

## 2024-08-13 PROCEDURE — 25000003 PHARM REV CODE 250: Performed by: INTERNAL MEDICINE

## 2024-08-13 PROCEDURE — 88305 TISSUE EXAM BY PATHOLOGIST: CPT | Performed by: PATHOLOGY

## 2024-08-13 PROCEDURE — 43239 EGD BIOPSY SINGLE/MULTIPLE: CPT | Mod: ,,, | Performed by: INTERNAL MEDICINE

## 2024-08-13 RX ORDER — PROPOFOL 10 MG/ML
VIAL (ML) INTRAVENOUS
Status: DISCONTINUED | OUTPATIENT
Start: 2024-08-13 | End: 2024-08-13

## 2024-08-13 RX ORDER — SODIUM CHLORIDE 9 MG/ML
INJECTION, SOLUTION INTRAVENOUS CONTINUOUS
Status: DISCONTINUED | OUTPATIENT
Start: 2024-08-13 | End: 2024-08-13 | Stop reason: HOSPADM

## 2024-08-13 RX ADMIN — Medication 20 MG: at 01:08

## 2024-08-13 RX ADMIN — SODIUM CHLORIDE: 9 INJECTION, SOLUTION INTRAVENOUS at 11:08

## 2024-08-13 RX ADMIN — Medication 100 MG: at 01:08

## 2024-08-13 RX ADMIN — Medication 40 MG: at 01:08

## 2024-08-13 RX ADMIN — SODIUM CHLORIDE: 0.9 INJECTION, SOLUTION INTRAVENOUS at 01:08

## 2024-08-13 NOTE — ANESTHESIA PREPROCEDURE EVALUATION
08/13/2024  Alejandra Ellis is a 49 y.o., female.      Pre-op Assessment    I have reviewed the Patient Summary Reports.     I have reviewed the Nursing Notes. I have reviewed the NPO Status.      Review of Systems  Anesthesia Hx:   History of prior surgery of interest to airway management or planning:            Denies Personal Hx of Anesthesia complications.                    Cardiovascular:  Exercise tolerance: good                                           Musculoskeletal:  Arthritis        Arthritis          Neurological:    Neuromuscular Disease,           Arthritis                         Neuromuscular Disease       Physical Exam  General: Well nourished    Airway:  Mallampati: II   Mouth Opening: Normal    Dental:  Intact        Anesthesia Plan  Type of Anesthesia, risks & benefits discussed:    Anesthesia Type: Gen Natural Airway  Informed Consent: Informed consent signed with the Patient and all parties understand the risks and agree with anesthesia plan.  All questions answered.   ASA Score: 2    Ready For Surgery From Anesthesia Perspective.     .

## 2024-08-13 NOTE — TRANSFER OF CARE
"Anesthesia Transfer of Care Note    Patient: Alejandra Ellis    Procedure(s) Performed: Procedure(s) (LRB):  EGD (ESOPHAGOGASTRODUODENOSCOPY) (N/A)    Patient location: PACU    Anesthesia Type: general    Transport from OR: Transported from OR on room air with adequate spontaneous ventilation    Post pain: adequate analgesia    Post assessment: no apparent anesthetic complications and tolerated procedure well    Post vital signs: stable    Level of consciousness: responds to stimulation    Nausea/Vomiting: no vomiting    Complications: none    Transfer of care protocol was followed      Last vitals: Visit Vitals  BP (!) 154/74 (BP Location: Left arm, Patient Position: Lying)   Pulse 85   Temp 36.8 °C (98.2 °F) (Temporal)   Resp 18   Ht 5' 5" (1.651 m)   Wt 54.4 kg (120 lb)   LMP 02/28/2020   SpO2 98%   Breastfeeding No   BMI 19.97 kg/m²     "

## 2024-08-13 NOTE — ANESTHESIA POSTPROCEDURE EVALUATION
Anesthesia Post Evaluation    Patient: Alejandra Ellis    Procedure(s) Performed: Procedure(s) (LRB):  EGD (ESOPHAGOGASTRODUODENOSCOPY) (N/A)    Final Anesthesia Type: general      Patient location during evaluation: PACU  Patient participation: Yes- Able to Participate  Level of consciousness: awake and alert  Post-procedure vital signs: reviewed and stable  Pain management: adequate  Airway patency: patent    PONV status at discharge: No PONV  Anesthetic complications: no      Cardiovascular status: blood pressure returned to baseline  Respiratory status: unassisted  Hydration status: euvolemic                Vitals Value Taken Time   /73 08/13/24 1400   Temp 36.7 °C (98 °F) 08/13/24 1330   Pulse 82 08/13/24 1400   Resp 18 08/13/24 1400   SpO2 100 % 08/13/24 1400         No case tracking events are documented in the log.      Pain/Gwyn Score: Gwyn Score: 10 (8/13/2024  2:00 PM)

## 2024-08-13 NOTE — H&P
Short Stay Endoscopy History and Physical    PCP - No, Primary Doctor    Procedure - EGD  ASA - II  Mallampati - per anesthesia  History of Anesthesia problems - no  Family history Anesthesia problems - no     HPI:  This is a 49 y.o. female here for evaluation of : abd pain    ROS:  Constitutional: No fevers, chills, No weight loss  ENT: No allergies  CV: No chest pain  Pulm: No shortness of breath  GI: see HPI  Derm: No rash    Medical History:  has a past medical history of Lumbar radiculopathy.    Surgical History:  has a past surgical history that includes Epidural steroid injection (Bilateral, 3/11/2020).    Family History: family history is not on file.. Otherwise no colon cancer, inflammatory bowel disease, or GI malignancies.    Social History:  reports that she has never smoked. She has never used smokeless tobacco. She reports that she does not drink alcohol and does not use drugs.    Review of patient's allergies indicates:  No Known Allergies    Medications:   Medications Prior to Admission   Medication Sig Dispense Refill Last Dose    ergocalciferol (ERGOCALCIFEROL) 50,000 unit Cap take ONE CAPSULE every week  3     FLUCELVAX QUAD 0261-2706 60 mcg (15 mcg x 4)/0.5 mL Susp        omeprazole (PRILOSEC) 40 MG capsule Take 1 capsule (40 mg total) by mouth every morning. (Patient not taking: Reported on 1/29/2024) 90 capsule 3          Objective Findings:    Vital Signs: see nursing notes  Physical Exam:  General Appearance: Well appearing in no acute distress  Eyes:    No scleral icterus  ENT: Neck supple  Lungs: CTA anteriorly  Heart:  S1, S2 normal, no murmurs heard  Abdomen: Soft, non tender, non distended with positive bowel sounds. No hepatosplenomegaly, ascites, or mass  Extremities: no edema  Skin: No rash      Labs:  Lab Results   Component Value Date    WBC 6.63 12/26/2023    HGB 14.0 12/26/2023    HCT 42.7 12/26/2023     12/26/2023    CHOL 182 12/26/2023    TRIG 82 12/26/2023    HDL 60  12/26/2023    ALT 16 12/26/2023    AST 21 12/26/2023     12/26/2023    K 4.3 12/26/2023     12/26/2023    CREATININE 0.7 12/26/2023    BUN 13 12/26/2023    CO2 28 12/26/2023    HGBA1C 5.1 12/26/2023       I have explained the risks and benefits of endoscopy procedures to the patient including but not limited to bleeding, perforation, infection, and death.    Erich Baptiste MD

## 2024-08-15 LAB
FINAL PATHOLOGIC DIAGNOSIS: NORMAL
GROSS: NORMAL
Lab: NORMAL

## 2024-08-21 ENCOUNTER — PATIENT OUTREACH (OUTPATIENT)
Dept: ADMINISTRATIVE | Facility: HOSPITAL | Age: 49
End: 2024-08-21
Payer: COMMERCIAL

## 2024-08-21 NOTE — [employer] PATIENT INSTRUCTIONS
Discharge Summary/Instructions after an Endoscopic Procedure  Patient Name: Alejandra Ellis  Patient MRN: [id number]  Patient YOB: 1975 Tuesday, August 13, 2024  Erich Baptiste MD  Dear patient,  As a result of recent federal legislation (The Federal Cures Act), you may   receive lab or pathology results from your procedure in your [facility]   account before your physician is able to contact you. Your physician or   their representative will relay the results to you with their   recommendations at their soonest availability.  Thank you,  Your health is very important to us during the Covid Crisis. Following your   procedure today, you will receive a daily text for 2 weeks asking about   signs or symptoms of Covid 19.  Please respond to this text when you   receive it so we can follow up and keep you as safe as possible.   RESTRICTIONS:  During your procedure today, you received medications for sedation.  These   medications may affect your judgment, balance and coordination.  Therefore,   for 24 hours, you have the following restrictions:   - DO NOT drive a car, operate machinery, make legal/financial decisions,   sign important papers or drink alcohol.    ACTIVITY:  Today: no heavy lifting, straining or running due to procedural   sedation/anesthesia.  The following day: return to full activity including work.  DIET:  Eat and drink normally unless instructed otherwise.     TREATMENT FOR COMMON SIDE EFFECTS:  - Mild abdominal pain, nausea, belching, bloating or excessive gas:  rest,   eat lightly and use a heating pad.  - Sore Throat: treat with throat lozenges and/or gargle with warm salt   water.  - Because air was used during the procedure, expelling large amounts of air   from your rectum or belching is normal.  - If a bowel prep was taken, you may not have a bowel movement for 1-3 days.    This is normal.  SYMPTOMS TO WATCH FOR AND REPORT TO YOUR PHYSICIAN:  1. Abdominal pain or bloating, other  than gas cramps.  2. Chest pain.  3. Back pain.  4. Signs of infection such as: chills or fever occurring within 24 hours   after the procedure.  5. Rectal bleeding, which would show as bright red, maroon, or black stools.   (A tablespoon of blood from the rectum is not serious, especially if   hemorrhoids are present.)  6. Vomiting.  7. Weakness or dizziness.  GO DIRECTLY TO THE NEAREST EMERGENCY ROOM IF YOU HAVE ANY OF THE FOLLOWING:      Difficulty breathing              Chills and/or fever over 101 F   Persistent vomiting and/or vomiting blood   Severe abdominal pain   Severe chest pain   Black, tarry stools   Bleeding- more than one tablespoon   Any other symptom or condition that you feel may need urgent attention  Your doctor recommends these additional instructions:  If any biopsies were taken, your doctors clinic will contact you in 1 to 2   weeks with any results.  - Discharge patient to home.   - Resume previous diet.   - Continue present medications.   - Await pathology results.   - Return to referring physician PRN.  For questions, problems or results please call your physician - Erich Baptiste MD.  EMERGENCY PHONE NUMBER: [phone],  LAB RESULTS: [phone]  IF A COMPLICATION OR EMERGENCY SITUATION ARISES AND YOU ARE UNABLE TO REACH   YOUR PHYSICIAN - GO DIRECTLY TO THE EMERGENCY ROOM.  Erich Baptiste MD  8/21/2024 12:38:23 PM  This report has been verified and signed electronically.  Dear patient,  As a result of recent federal legislation (The Federal Cures Act), you may   receive lab or pathology results from your procedure in your [facility]   account before your physician is able to contact you. Your physician or   their representative will relay the results to you with their   recommendations at their soonest availability.  Thank you,  [employer]ATION

## 2024-08-21 NOTE — PROGRESS NOTES
Health Maintenance Due   Topic Date Due    Shingles Vaccine (1 of 2) Never done    COVID-19 Vaccine (3 - 2023-24 season) 09/01/2023      Health Maintenance reviewed, updated and links triggered. Annual Exam due, portal message sent for   scheduling. ([facility]) 8/21/24

## 2024-08-21 NOTE — PROGRESS NOTES
Pathologic findings for recent EGD reviewed.  No evidence of H pylori infection identified.  Some mild inflammation was noted.  Continue current medications.  Follow up as needed with Rafaela

## 2024-09-10 ENCOUNTER — OFFICE VISIT (OUTPATIENT)
Dept: OPTOMETRY | Facility: CLINIC | Age: 49
End: 2024-09-10
Payer: COMMERCIAL

## 2024-09-10 DIAGNOSIS — Z01.00 EYE EXAM, ROUTINE: Primary | ICD-10-CM

## 2024-09-10 DIAGNOSIS — H52.203 HYPEROPIA OF BOTH EYES WITH ASTIGMATISM AND PRESBYOPIA: ICD-10-CM

## 2024-09-10 DIAGNOSIS — H52.03 HYPEROPIA OF BOTH EYES WITH ASTIGMATISM AND PRESBYOPIA: ICD-10-CM

## 2024-09-10 DIAGNOSIS — H52.4 HYPEROPIA OF BOTH EYES WITH ASTIGMATISM AND PRESBYOPIA: ICD-10-CM

## 2024-09-10 PROCEDURE — 92004 COMPRE OPH EXAM NEW PT 1/>: CPT | Mod: S$GLB,,, | Performed by: OPTOMETRIST

## 2024-09-10 PROCEDURE — 92015 DETERMINE REFRACTIVE STATE: CPT | Mod: S$GLB,,, | Performed by: OPTOMETRIST

## 2024-09-10 PROCEDURE — 99999 PR PBB SHADOW E&M-EST. PATIENT-LVL II: CPT | Mod: PBBFAC,,, | Performed by: OPTOMETRIST

## 2024-09-10 PROCEDURE — 1159F MED LIST DOCD IN RCRD: CPT | Mod: CPTII,S$GLB,, | Performed by: OPTOMETRIST

## 2024-09-10 RX ORDER — GABAPENTIN 300 MG/1
CAPSULE ORAL DAILY
COMMUNITY
Start: 2024-07-09

## 2024-09-10 NOTE — PROGRESS NOTES
HPI    DLS: 4 years ago    Eye Meds: No gtts     49 y.o. female is here for ocular health check.   Denies eye pain and floaters.   Occasional flashes, right eye for 2 years.   No discomfort.   No noticeable VA changes since last visit.   Sometimes have trouble with glare when driving outside per pt.   Last edited by Quique Kern, OD on 9/10/2024  8:47 AM.            Assessment /Plan     For exam results, see Encounter Report.    Eye exam, routine  -Dry eye gave samples Systane Complete    Hyperopia of both eyes with astigmatism and presbyopia  Eyeglass Final Rx       Eyeglass Final Rx         Sphere Cylinder Axis Dist VA Add    Right +0.50 +0.75 075 20/30 +1.50    Left +1.00 +0.75 090 20/50 +1.50      Type: PAL    Expiration Date: 9/10/2025                      RTC 1 yr

## 2024-10-17 ENCOUNTER — PATIENT MESSAGE (OUTPATIENT)
Dept: RESEARCH | Facility: HOSPITAL | Age: 49
End: 2024-10-17
Payer: COMMERCIAL

## 2024-10-22 ENCOUNTER — PATIENT MESSAGE (OUTPATIENT)
Dept: RESEARCH | Facility: HOSPITAL | Age: 49
End: 2024-10-22
Payer: COMMERCIAL

## 2025-01-08 ENCOUNTER — TELEPHONE (OUTPATIENT)
Dept: INTERNAL MEDICINE | Facility: CLINIC | Age: 50
End: 2025-01-08
Payer: COMMERCIAL

## 2025-01-08 DIAGNOSIS — Z12.31 OTHER SCREENING MAMMOGRAM: ICD-10-CM

## 2025-01-08 NOTE — TELEPHONE ENCOUNTER
Spoke with [facility] ,     ----- Message from Reji Oconnor MD sent at 1/8/2025 10:34 AM CST -----  I have never seen this patient.  She needs to establish with me or another provider for continued care       3/10/2025 11:00 AM (40 min)  Imelda   ECA   Authorization not required   [facility] ([facility])   Reji Oconnor MD       Not 11:20 am as discussed

## 2025-01-08 NOTE — TELEPHONE ENCOUNTER
----- Message from Reji Oconnor MD sent at 1/8/2025 10:34 AM CST -----  I have never seen this patient.  She needs to establish with me or another provider for continued care

## 2025-01-08 NOTE — TELEPHONE ENCOUNTER
Spoke with [facility] ,     ----- Message from Reji Oconnor MD sent at 1/8/2025 10:34 AM CST -----  I have never seen this patient.  She needs to establish with me or another provider for continued care       3/10/2025 11:00 AM (40 min)  Imelda   ECA   Authorization not required   [facility] ([facility])   Reji Oconnor MD